# Patient Record
Sex: FEMALE | Race: WHITE | NOT HISPANIC OR LATINO | Employment: FULL TIME | URBAN - METROPOLITAN AREA
[De-identification: names, ages, dates, MRNs, and addresses within clinical notes are randomized per-mention and may not be internally consistent; named-entity substitution may affect disease eponyms.]

---

## 2018-06-17 ENCOUNTER — OFFICE VISIT (OUTPATIENT)
Dept: URGENT CARE | Facility: URGENT CARE | Age: 53
End: 2018-06-17
Payer: COMMERCIAL

## 2018-06-17 VITALS
HEART RATE: 97 BPM | TEMPERATURE: 98.2 F | DIASTOLIC BLOOD PRESSURE: 80 MMHG | SYSTOLIC BLOOD PRESSURE: 132 MMHG | WEIGHT: 228.7 LBS | RESPIRATION RATE: 20 BRPM | OXYGEN SATURATION: 99 %

## 2018-06-17 DIAGNOSIS — H60.391 INFECTIVE OTITIS EXTERNA, RIGHT: Primary | ICD-10-CM

## 2018-06-17 PROCEDURE — 99203 OFFICE O/P NEW LOW 30 MIN: CPT | Performed by: NURSE PRACTITIONER

## 2018-06-17 RX ORDER — LOSARTAN POTASSIUM 25 MG/1
25 TABLET ORAL DAILY
COMMUNITY
Start: 2018-01-09

## 2018-06-17 RX ORDER — CEFDINIR 300 MG/1
300 CAPSULE ORAL 2 TIMES DAILY
Qty: 20 CAPSULE | Refills: 0 | Status: SHIPPED | OUTPATIENT
Start: 2018-06-17 | End: 2022-12-22

## 2018-06-17 RX ORDER — CHOLECALCIFEROL (VITAMIN D3) 50 MCG
2000 TABLET ORAL DAILY
COMMUNITY
Start: 2016-07-22

## 2018-06-17 RX ORDER — MUPIROCIN 20 MG/G
OINTMENT TOPICAL 3 TIMES DAILY
Qty: 22 G | Refills: 1 | Status: SHIPPED | OUTPATIENT
Start: 2018-06-17 | End: 2018-06-22

## 2018-06-17 RX ORDER — ASPIRIN 81 MG/1
81 TABLET ORAL EVERY EVENING
COMMUNITY
Start: 2014-08-01

## 2018-06-17 RX ORDER — BLOOD-GLUCOSE METER
EACH MISCELLANEOUS
COMMUNITY
Start: 2014-08-01

## 2018-06-17 RX ORDER — GLIMEPIRIDE 2 MG/1
2 TABLET ORAL
COMMUNITY
Start: 2018-01-09

## 2018-06-17 RX ORDER — SIMVASTATIN 10 MG
10 TABLET ORAL AT BEDTIME
COMMUNITY
Start: 2018-01-09

## 2018-06-17 NOTE — PROGRESS NOTES
SUBJECTIVE: Guerita Gonzalez is a 52 year old female with 1 days history of pain and swelling of LEFT ear. Temperature not elevated at home. Reports no changes in activity level, appetite, sleep, bowel or bladder habits. Reports taking no OTC medications.     OBJECTIVE:  /80  Pulse 97  Temp 98.2  F (36.8  C) (Oral)  Resp 20  Wt 228 lb 11.2 oz (103.7 kg)  SpO2 99%  General: Alert, oriented in no acute distress  ENT: RIGHT Pinna with one  erythemic pustule noted, tragus and helix tender to light touch, TM with cloudy fluid, dull.  LEFT TM clear with good light reflex, without fluid or erythema, canals clear without erythema. Nose with congestion. Throat and pharynx with minimal erythema  Neck: supple with no adenopathy   CV: S1, S2 auscultated with regular rate and rhythm, no gallops, murmurs or rubs noted  Lungs: Bilateral lobes clear throughout without wheezes, crackles, or rhonchi noted.     ASSESSMENT: RIGHT Infective otitis externa    PLAN: Discussed ear infection diagnosis, plan and treatment with antibiotics, advised completion of full course and follow up if symptoms do not improve or fever persists past 3 days. OTC tylenol or short course NSAIDs  as needed for pain and swelling. Also discussed use of Bactroban topical ointment. Follow up as needed. Parent agreed to the plan of care.       London Sanchez, APRN, CNP

## 2018-06-17 NOTE — PATIENT INSTRUCTIONS
External Ear Infection (Adult)    External otitis (also called  swimmer s ear ) is an infection in the ear canal. It is often caused by bacteria or fungus. It can occur a few days after water gets trapped in the ear canal (from swimming or bathing). It can also occur after cleaning too deeply in the ear canal with a cotton swab or other object. Sometimes, hair care products get into the ear canal and cause this problem.  Symptoms can include pain, fever, itching, redness, drainage, or swelling of the ear canal. Temporary hearing loss may also occur.  Home care    Do not try to clean the ear canal. This can push pus and bacteria deeper into the canal.    Use prescribed ear drops as directed. These help reduce swelling and fight the infection. If an ear wick was placed in the ear canal, apply drops right onto the end of the wick. The wick will draw the medicine into the ear canal even if it is swollen closed.    A cotton ball may be loosely placed in the outer ear to absorb any drainage.    You may use acetaminophen or ibuprofen to control pain, unless another medicine was prescribed. Note: If you have chronic liver or kidney disease or ever had a stomach ulcer or GI bleeding, talk to your healthcare provider before taking any of these medicines.    Do not allow water to get into your ear when bathing. Also, don't swim until the infection has cleared.  Prevention    Keep your ears dry. This helps lower the risk of infection. Dry your ears with a towel or hair dryer after getting wet. Also, use ear plugs when swimming.    Do not stick any objects in the ear to remove wax.    If you feel water trapped in your ear, use ear drops right away. You can get these drops over the counter at most drugstores. They work by removing water from the ear canal.  Follow-up care  Follow up with your healthcare provider in 1 week, or as advised.  When to seek medical advice  Call your healthcare provider right away if any of these  occur:    Ear pain becomes worse or doesn t improve after 3 days of treatment    Redness or swelling of the outer ear occurs or gets worse    Headache    Painful or stiff neck    Drowsiness or confusion    Fever of 100.4 F (38 C) or higher, or as directed by your healthcare provider    Seizure  Date Last Reviewed: 10/1/2017    6929-5567 [employer]. [address] [place] [address]. All rights reserved. This information is not intended as a substitute for professional medical care. Always follow your healthcare professional's instructions.        External Ear Infection (Adult)    External otitis (also called  swimmer s ear ) is an infection in the ear canal. It is often caused by bacteria or fungus. It can occur a few days after water gets trapped in the ear canal (from swimming or bathing). It can also occur after cleaning too deeply in the ear canal with a cotton swab or other object. Sometimes, hair care products get into the ear canal and cause this problem.  Symptoms can include pain, fever, itching, redness, drainage, or swelling of the ear canal. Temporary hearing loss may also occur.  Home care    Do not try to clean the ear canal. This can push pus and bacteria deeper into the canal.    Use prescribed ear drops as directed. These help reduce swelling and fight the infection. If an ear wick was placed in the ear canal, apply drops right onto the end of the wick. The wick will draw the medicine into the ear canal even if it is swollen closed.    A cotton ball may be loosely placed in the outer ear to absorb any drainage.    You may use acetaminophen or ibuprofen to control pain, unless another medicine was prescribed. Note: If you have chronic liver or kidney disease or ever had a stomach ulcer or GI bleeding, talk to your healthcare provider before taking any of these medicines.    Do not allow water to get into your ear when bathing. Also, don't swim until the infection has  cleared.  Prevention    Keep your ears dry. This helps lower the risk of infection. Dry your ears with a towel or hair dryer after getting wet. Also, use ear plugs when swimming.    Do not stick any objects in the ear to remove wax.    If you feel water trapped in your ear, use ear drops right away. You can get these drops over the counter at most drugstores. They work by removing water from the ear canal.  Follow-up care  Follow up with your healthcare provider in 1 week, or as advised.  When to seek medical advice  Call your healthcare provider right away if any of these occur:    Ear pain becomes worse or doesn t improve after 3 days of treatment    Redness or swelling of the outer ear occurs or gets worse    Headache    Painful or stiff neck    Drowsiness or confusion    Fever of 100.4 F (38 C) or higher, or as directed by your healthcare provider    Seizure  Date Last Reviewed: 10/1/2017    9261-6826 [employer]. [address] [place] [address], [place] [address]. All rights reserved. This information is not intended as a substitute for professional medical care. Always follow your healthcare professional's instructions.

## 2018-06-17 NOTE — MR AVS SNAPSHOT
After Visit Summary   6/17/2018    Guerita Gonzalez    MRN: [id number]           Patient Information     Date Of Birth          1965        Visit Information        Provider Department      6/17/2018 11:50 AM Lavon Sanchez APRN CNP [facility]        Today's Diagnoses     Infective otitis externa, right    -  1      Care Instructions      External Ear Infection (Adult)    External otitis (also called  swimmer s ear ) is an infection in the ear canal. It is often caused by bacteria or fungus. It can occur a few days after water gets trapped in the ear canal (from swimming or bathing). It can also occur after cleaning too deeply in the ear canal with a cotton swab or other object. Sometimes, hair care products get into the ear canal and cause this problem.  Symptoms can include pain, fever, itching, redness, drainage, or swelling of the ear canal. Temporary hearing loss may also occur.  Home care    Do not try to clean the ear canal. This can push pus and bacteria deeper into the canal.    Use prescribed ear drops as directed. These help reduce swelling and fight the infection. If an ear wick was placed in the ear canal, apply drops right onto the end of the wick. The wick will draw the medicine into the ear canal even if it is swollen closed.    A cotton ball may be loosely placed in the outer ear to absorb any drainage.    You may use acetaminophen or ibuprofen to control pain, unless another medicine was prescribed. Note: If you have chronic liver or kidney disease or ever had a stomach ulcer or GI bleeding, talk to your healthcare provider before taking any of these medicines.    Do not allow water to get into your ear when bathing. Also, don't swim until the infection has cleared.  Prevention    Keep your ears dry. This helps lower the risk of infection. Dry your ears with a towel or hair dryer after getting wet. Also, use ear plugs when swimming.    Do not  stick any objects in the ear to remove wax.    If you feel water trapped in your ear, use ear drops right away. You can get these drops over the counter at most drugstores. They work by removing water from the ear canal.  Follow-up care  Follow up with your healthcare provider in 1 week, or as advised.  When to seek medical advice  Call your healthcare provider right away if any of these occur:    Ear pain becomes worse or doesn t improve after 3 days of treatment    Redness or swelling of the outer ear occurs or gets worse    Headache    Painful or stiff neck    Drowsiness or confusion    Fever of 100.4 F (38 C) or higher, or as directed by your healthcare provider    Seizure  Date Last Reviewed: 10/1/2017    8089-1633 [employer]. [address] [place] [address], [place] [address]. All rights reserved. This information is not intended as a substitute for professional medical care. Always follow your healthcare professional's instructions.        External Ear Infection (Adult)    External otitis (also called  swimmer s ear ) is an infection in the ear canal. It is often caused by bacteria or fungus. It can occur a few days after water gets trapped in the ear canal (from swimming or bathing). It can also occur after cleaning too deeply in the ear canal with a cotton swab or other object. Sometimes, hair care products get into the ear canal and cause this problem.  Symptoms can include pain, fever, itching, redness, drainage, or swelling of the ear canal. Temporary hearing loss may also occur.  Home care    Do not try to clean the ear canal. This can push pus and bacteria deeper into the canal.    Use prescribed ear drops as directed. These help reduce swelling and fight the infection. If an ear wick was placed in the ear canal, apply drops right onto the end of the wick. The wick will draw the medicine into the ear canal even if it is swollen closed.    A cotton ball may be loosely placed in the outer ear to  absorb any drainage.    You may use acetaminophen or ibuprofen to control pain, unless another medicine was prescribed. Note: If you have chronic liver or kidney disease or ever had a stomach ulcer or GI bleeding, talk to your healthcare provider before taking any of these medicines.    Do not allow water to get into your ear when bathing. Also, don't swim until the infection has cleared.  Prevention    Keep your ears dry. This helps lower the risk of infection. Dry your ears with a towel or hair dryer after getting wet. Also, use ear plugs when swimming.    Do not stick any objects in the ear to remove wax.    If you feel water trapped in your ear, use ear drops right away. You can get these drops over the counter at most drugstores. They work by removing water from the ear canal.  Follow-up care  Follow up with your healthcare provider in 1 week, or as advised.  When to seek medical advice  Call your healthcare provider right away if any of these occur:    Ear pain becomes worse or doesn t improve after 3 days of treatment    Redness or swelling of the outer ear occurs or gets worse    Headache    Painful or stiff neck    Drowsiness or confusion    Fever of 100.4 F (38 C) or higher, or as directed by your healthcare provider    Seizure  Date Last Reviewed: 10/1/2017    5606-5135 [employer]. [address] [place] [address], [place] [address]. All rights reserved. This information is not intended as a substitute for professional medical care. Always follow your healthcare professional's instructions.                Follow-ups after your visit        Follow-up notes from your care team     Return if symptoms worsen or fail to improve.      Who to contact     If you have questions or need follow up information about today's clinic visit or your schedule please contact [facility] directly at [phone].  Normal or non-critical lab and imaging results will be communicated to you by  MyChart, letter or phone within 4 business days after the clinic has received the results. If you do not hear from us within 7 days, please contact the clinic through MyChart or phone. If you have a critical or abnormal lab result, we will notify you by phone as soon as possible.  Submit refill requests through [employer] or call your pharmacy and they will forward the refill request to us. Please allow 3 business days for your refill to be completed.          Additional Information About Your Visit        Care EveryWhere ID     This is your Care EveryWhere ID. This could be used by other organizations to access your [place] medical records  [id number]        Your Vitals Were     Pulse Temperature Respirations Pulse Oximetry          97 98.2  F (36.8  C) (Oral) 20 99%         Blood Pressure from Last 3 Encounters:   06/17/18 132/80   03/30/13 144/84    Weight from Last 3 Encounters:   06/17/18 228 lb 11.2 oz (103.7 kg)   03/30/13 224 lb (101.6 kg)              Today, you had the following     No orders found for display         Today's Medication Changes          These changes are accurate as of 6/17/18  1:31 PM.  If you have any questions, ask your nurse or doctor.               Start taking these medicines.        Dose/Directions    cefdinir 300 MG capsule   Commonly known as:  OMNICEF   Used for:  Infective otitis externa, right        Dose:  300 mg   Take 1 capsule (300 mg) by mouth 2 times daily   Quantity:  20 capsule   Refills:  0       mupirocin 2 % ointment   Commonly known as:  BACTROBAN   Used for:  Infective otitis externa, right        Apply topically 3 times daily for 5 days   Quantity:  22 g   Refills:  1            Where to get your medicines      These medications were sent to [id number] - [place], [place] - [address]  [address] [address]     Phone:  [phone]     cefdinir 300 MG capsule    mupirocin 2 % ointment                Primary Care Provider     None Specified       No primary provider on file.        Equal Access to Services     JARROD URBAN : Hadii aad ku hadjadkimani Kaminski, waluis ada kanerodneyha, qabettyta sarahmajulissa maravilla. So [facility] [phone].    ATENCIÓN: Si habla español, tiene a julien disposición servicios gratuitos de asistencia lingüística. Dilipame al [phone].    We comply with applicable federal civil rights laws and [place] laws. We do not discriminate on the basis of race, color, national origin, age, disability, sex, sexual orientation, or gender identity.            Thank you!     Thank you for choosing [facility]  for your care. Our goal is always to provide you with excellent care. Hearing back from our patients is one way we can continue to improve our services. Please take a few minutes to complete the written survey that you may receive in the mail after your visit with us. Thank you!             Your Updated Medication List - Protect others around you: Learn how to safely use, store and throw away your medicines at www.disposemymeds.org.          This list is accurate as of 6/17/18  1:31 PM.  Always use your most recent med list.                   Brand Name Dispense Instructions for use Diagnosis    amLODIPine 10 MG tablet    NORVASC     Take 10 mg by mouth daily.        aspirin 81 MG EC tablet      Take 81 mg by mouth        cefdinir 300 MG capsule    OMNICEF    20 capsule    Take 1 capsule (300 mg) by mouth 2 times daily    Infective otitis externa, right       fluticasone 50 MCG/ACT spray    FLONASE    1 Package    Spray 2 sprays in nostril daily.    URI (upper respiratory infection)       glimepiride 2 MG tablet    AMARYL     Take 2 mg by mouth        [employer] TEST STRIPS test strip   Generic drug:  blood glucose monitoring      Dispense test strips covered by the patient insurance. Test 2-4 times per day.        losartan 25 MG tablet    COZAAR     Take 25 mg by mouth         metFORMIN 1000 MG tablet    GLUCOPHAGE     Take 1,000 mg by mouth        mupirocin 2 % ointment    BACTROBAN    22 g    Apply topically 3 times daily for 5 days    Infective otitis externa, right       simvastatin 10 MG tablet    ZOCOR     Take 10 mg by mouth        [employer] BLOOD GLUCOSE MONITOR w/Device Kit      Dispense [employer] Deana glucose meter, test strips and lancets covered by the patient insurance. Test 2-4  times per day.        vitamin D 2000 units tablet      Take 2,000 Units by mouth

## 2021-06-15 ENCOUNTER — HOSPITAL ENCOUNTER (EMERGENCY)
Facility: CLINIC | Age: 56
Discharge: HOME | End: 2021-06-15
Attending: PHYSICIAN ASSISTANT | Admitting: PHYSICIAN ASSISTANT
Payer: COMMERCIAL

## 2021-06-15 ENCOUNTER — APPOINTMENT (OUTPATIENT)
Dept: CT IMAGING | Facility: CLINIC | Age: 56
End: 2021-06-15
Attending: PHYSICIAN ASSISTANT
Payer: COMMERCIAL

## 2021-06-15 VITALS
HEIGHT: 65 IN | HEART RATE: 99 BPM | BODY MASS INDEX: 38.06 KG/M2 | OXYGEN SATURATION: 96 % | TEMPERATURE: 97.9 F | SYSTOLIC BLOOD PRESSURE: 122 MMHG | DIASTOLIC BLOOD PRESSURE: 78 MMHG | RESPIRATION RATE: 18 BRPM

## 2021-06-15 DIAGNOSIS — R10.9 RIGHT FLANK PAIN: ICD-10-CM

## 2021-06-15 LAB
ALBUMIN SERPL-MCNC: 3.8 G/DL (ref 3.4–5)
ALBUMIN UR-MCNC: 50 MG/DL
ALP SERPL-CCNC: 94 U/L (ref 40–150)
ALT SERPL W P-5'-P-CCNC: 72 U/L (ref 0–50)
AMORPH CRY #/AREA URNS HPF: ABNORMAL /HPF
ANION GAP SERPL CALCULATED.3IONS-SCNC: 13 MMOL/L (ref 3–14)
APPEARANCE UR: ABNORMAL
AST SERPL W P-5'-P-CCNC: 55 U/L (ref 0–45)
BASOPHILS # BLD AUTO: 0.1 10E9/L (ref 0–0.2)
BASOPHILS NFR BLD AUTO: 0.9 %
BILIRUB SERPL-MCNC: 0.6 MG/DL (ref 0.2–1.3)
BILIRUB UR QL STRIP: NEGATIVE
BUN SERPL-MCNC: 7 MG/DL (ref 7–30)
CALCIUM SERPL-MCNC: 9.6 MG/DL (ref 8.5–10.1)
CHLORIDE SERPL-SCNC: 103 MMOL/L (ref 94–109)
CO2 SERPL-SCNC: 22 MMOL/L (ref 20–32)
COLOR UR AUTO: ABNORMAL
CREAT SERPL-MCNC: 0.58 MG/DL (ref 0.52–1.04)
DIFFERENTIAL METHOD BLD: ABNORMAL
EOSINOPHIL # BLD AUTO: 0.2 10E9/L (ref 0–0.7)
EOSINOPHIL NFR BLD AUTO: 1.4 %
ERYTHROCYTE [DISTWIDTH] IN BLOOD BY AUTOMATED COUNT: 14.3 % (ref 10–15)
GFR SERPL CREATININE-BSD FRML MDRD: >90 ML/MIN/{1.73_M2}
GLUCOSE SERPL-MCNC: 185 MG/DL (ref 70–99)
GLUCOSE UR STRIP-MCNC: 30 MG/DL
HCT VFR BLD AUTO: 42.7 % (ref 35–47)
HGB BLD-MCNC: 14.4 G/DL (ref 11.7–15.7)
HGB UR QL STRIP: NEGATIVE
IMM GRANULOCYTES # BLD: 0.1 10E9/L (ref 0–0.4)
IMM GRANULOCYTES NFR BLD: 0.7 %
KETONES UR STRIP-MCNC: 10 MG/DL
LEUKOCYTE ESTERASE UR QL STRIP: NEGATIVE
LIPASE SERPL-CCNC: 139 U/L (ref 73–393)
LYMPHOCYTES # BLD AUTO: 3.9 10E9/L (ref 0.8–5.3)
LYMPHOCYTES NFR BLD AUTO: 25.7 %
MCH RBC QN AUTO: 30.3 PG (ref 26.5–33)
MCHC RBC AUTO-ENTMCNC: 33.7 G/DL (ref 31.5–36.5)
MCV RBC AUTO: 90 FL (ref 78–100)
MONOCYTES # BLD AUTO: 1 10E9/L (ref 0–1.3)
MONOCYTES NFR BLD AUTO: 6.4 %
MUCOUS THREADS #/AREA URNS LPF: PRESENT /LPF
NEUTROPHILS # BLD AUTO: 9.9 10E9/L (ref 1.6–8.3)
NEUTROPHILS NFR BLD AUTO: 64.9 %
NITRATE UR QL: NEGATIVE
NRBC # BLD AUTO: 0 10*3/UL
NRBC BLD AUTO-RTO: 0 /100
PH UR STRIP: 5.5 PH (ref 5–7)
PLATELET # BLD AUTO: 341 10E9/L (ref 150–450)
POTASSIUM SERPL-SCNC: 3.4 MMOL/L (ref 3.4–5.3)
PROT SERPL-MCNC: 8.2 G/DL (ref 6.8–8.8)
RBC # BLD AUTO: 4.76 10E12/L (ref 3.8–5.2)
RBC #/AREA URNS AUTO: 0 /HPF (ref 0–2)
SODIUM SERPL-SCNC: 138 MMOL/L (ref 133–144)
SOURCE: ABNORMAL
SP GR UR STRIP: 1.03 (ref 1–1.03)
SQUAMOUS #/AREA URNS AUTO: 8 /HPF (ref 0–1)
UROBILINOGEN UR STRIP-MCNC: 2 MG/DL (ref 0–2)
WBC # BLD AUTO: 15.2 10E9/L (ref 4–11)
WBC #/AREA URNS AUTO: 0 /HPF (ref 0–5)

## 2021-06-15 PROCEDURE — 96374 THER/PROPH/DIAG INJ IV PUSH: CPT

## 2021-06-15 PROCEDURE — 83690 ASSAY OF LIPASE: CPT | Performed by: PHYSICIAN ASSISTANT

## 2021-06-15 PROCEDURE — 250N000011 HC RX IP 250 OP 636: Performed by: PHYSICIAN ASSISTANT

## 2021-06-15 PROCEDURE — 96375 TX/PRO/DX INJ NEW DRUG ADDON: CPT

## 2021-06-15 PROCEDURE — 258N000003 HC RX IP 258 OP 636: Performed by: PHYSICIAN ASSISTANT

## 2021-06-15 PROCEDURE — 81001 URINALYSIS AUTO W/SCOPE: CPT | Performed by: PHYSICIAN ASSISTANT

## 2021-06-15 PROCEDURE — 99285 EMERGENCY DEPT VISIT HI MDM: CPT | Mod: 25

## 2021-06-15 PROCEDURE — 80053 COMPREHEN METABOLIC PANEL: CPT | Performed by: PHYSICIAN ASSISTANT

## 2021-06-15 PROCEDURE — 74176 CT ABD & PELVIS W/O CONTRAST: CPT

## 2021-06-15 PROCEDURE — 85025 COMPLETE CBC W/AUTO DIFF WBC: CPT | Performed by: PHYSICIAN ASSISTANT

## 2021-06-15 PROCEDURE — 96361 HYDRATE IV INFUSION ADD-ON: CPT

## 2021-06-15 RX ORDER — OXYCODONE HYDROCHLORIDE 5 MG/1
5 TABLET ORAL EVERY 6 HOURS PRN
Qty: 12 TABLET | Refills: 0 | Status: SHIPPED | OUTPATIENT
Start: 2021-06-15 | End: 2022-12-22

## 2021-06-15 RX ORDER — ONDANSETRON 2 MG/ML
4 INJECTION INTRAMUSCULAR; INTRAVENOUS EVERY 30 MIN PRN
Status: DISCONTINUED | OUTPATIENT
Start: 2021-06-15 | End: 2021-06-15 | Stop reason: HOSPADM

## 2021-06-15 RX ORDER — ONDANSETRON 4 MG/1
4 TABLET, ORALLY DISINTEGRATING ORAL EVERY 8 HOURS PRN
Qty: 10 TABLET | Refills: 0 | Status: SHIPPED | OUTPATIENT
Start: 2021-06-15 | End: 2021-06-18

## 2021-06-15 RX ORDER — HYDROMORPHONE HYDROCHLORIDE 1 MG/ML
0.5 INJECTION, SOLUTION INTRAMUSCULAR; INTRAVENOUS; SUBCUTANEOUS
Status: DISCONTINUED | OUTPATIENT
Start: 2021-06-15 | End: 2021-06-15 | Stop reason: HOSPADM

## 2021-06-15 RX ADMIN — HYDROMORPHONE HYDROCHLORIDE 0.5 MG: 1 INJECTION, SOLUTION INTRAMUSCULAR; INTRAVENOUS; SUBCUTANEOUS at 13:10

## 2021-06-15 RX ADMIN — ONDANSETRON 4 MG: 2 INJECTION INTRAMUSCULAR; INTRAVENOUS at 13:10

## 2021-06-15 RX ADMIN — SODIUM CHLORIDE 1000 ML: 9 INJECTION, SOLUTION INTRAVENOUS at 12:53

## 2021-06-15 ASSESSMENT — ENCOUNTER SYMPTOMS
ANAL BLEEDING: 0
CONSTIPATION: 0
BLOOD IN STOOL: 0
DIFFICULTY URINATING: 0
VOMITING: 0
FLANK PAIN: 1
SHORTNESS OF BREATH: 0
DIARRHEA: 0
DYSURIA: 0
NAUSEA: 1

## 2021-06-15 NOTE — ED PROVIDER NOTES
"  History   Chief Complaint:  Flank Pain       HPI   Guerita Gonzalez is a 55 year old female who presents with right lower flank pain. The patient stated that she has developed a flank pain this morning about 5 hours ago that has been getting progressively worse reaching 8/10 in severity at this moment, along with some nausea. She denies history of back pain or kidney stones. She further denied fever, chills, chest pain or shortness of breath, vomiting, diarrhea or constipation, urinary symptoms, vaginal bleeding discharge or pain, and any blood in stool or rectal bleeding, as well as any specific trauma or recent falls.    Review of Systems   Respiratory: Negative for shortness of breath.    Cardiovascular: Negative for chest pain.   Gastrointestinal: Positive for nausea. Negative for anal bleeding, blood in stool, constipation, diarrhea and vomiting.   Genitourinary: Positive for flank pain. Negative for difficulty urinating, dysuria, vaginal bleeding, vaginal discharge and vaginal pain.   All other systems reviewed and are negative.      Allergies:  Penicillins    Medications:  Amlodipine  Aspirin 81  Omnicef  Flonase  Glimepiride  Metformin  Simvastatin      Past Medical History:    Diabetes   Hyperlipidemia   Hypertension   Diabetic neuropathy     Past Surgical History:    Appendectomy     Family History:    Cancer   Father  Diabetes   Father  Heart disease   Father  Hypertension    Mother    Social History:  The patient was brought to the emergency department by private vehicle.  The patient presents to the emergency department alone.    Physical Exam     Patient Vitals for the past 24 hrs:   BP Temp Temp src Pulse Resp SpO2 Height   06/15/21 1430 (!) 151/79 -- -- 84 -- 97 % --   06/15/21 1400 (!) 158/94 -- -- 102 -- 94 % --   06/15/21 1300 (!) 153/76 -- -- 103 -- 97 % --   06/15/21 1223 (!) 164/90 97.9  F (36.6  C) Temporal 123 18 97 % 1.651 m (5' 5\")       Physical Exam  Constitutional: Pleasant. Cooperative. "   Eyes: Pupils equally round and reactive  HENT: Head is normal in appearance. Oropharynx is normal with moist mucus membranes.  Cardiovascular: Regular rate and rhythm and without murmurs.  Respiratory: Normal respiratory effort, lungs are clear bilaterally.  GI: Abdomen is soft, non-tender, non-distended. No guarding, rebound, or rigidity.  Musculoskeletal: No asymmetry of the lower extremities, no tenderness to palpation. No CVA TTP.  Skin: Normal, without rash.  Neurologic: Cranial nerves grossly intact, normal cognition, no focal deficits. Alert and oriented x 3.   Psychiatric: Normal affect.  Nursing notes and vital signs reviewed.    Emergency Department Course     Imaging:    CT Abdomen Pelvis w/o Contrast  1.  Normal noncontrast CT of the abdomen and pelvis. No urinary tract  calculi. No specific finding to explain the patient's pain.    Reading per radiology.    Laboratory:    CBC: WBC 15.2, HGB 14.4,      CMP: Glucose 185 (H), ALT: 72 (H), AST: 55 (H), o/w WNL (Creatinine: 0.58)    UA: Glucose: 30, Ketones: 10, Protein Albumin: 50, Squamous Epithelial: 8 (H), Mucous: Present, Amorphous Crystals: few, o/w Negative    Lipase: 139      Emergency Department Course:    Reviewed:  I reviewed nursing notes, vitals and past medical history    Assessments/Consults    1240 I obtained history and examined the patient as noted above.     1457 I reassessed the patient and explained findings.     Interventions:  1253 NS 1000 mL IV  1310 Zofran 4 mg IV  1310 Dilaudid 0.5 mg IV    Disposition:  The patient was discharged to home.       Impression & Plan     Medical Decision Making:  Guerita Gonzalez is a 55 year old female who presents to the ED for evaluation of right-sided flank pain.  See HPI as above for additional details.  Vitals and physical exam as above.  Differential was broad including kidney stone, pyelonephritis, shingles, SBO, perforated viscus, MSK, hernia, among others.  Work-up obtained as above.   Discussed with patient unclear etiology of her symptoms at this time.  No evidence for nefarious pathology at this time.  [place] patient was safe for discharge home with close outpatient follow-up with persistent pain.  Prescriptions for medications as below.  Discussed narcotic precautions. Discussed reasons to return. All questions answered. Patient discharged to home in stable condition.    Diagnosis:    ICD-10-CM    1. Right flank pain  R10.9        Discharge Medications:  New Prescriptions    ONDANSETRON (ZOFRAN ODT) 4 MG ODT TAB    Take 1 tablet (4 mg) by mouth every 8 hours as needed for nausea    OXYCODONE (ROXICODONE) 5 MG TABLET    Take 1 tablet (5 mg) by mouth every 6 hours as needed for pain       Scribe Disclosure:  IQue, am serving as a scribe at 12:55 PM on 6/15/2021 to document services personally performed by Marcelino Liao PA-C based on my observations and the provider's statements to me.     This record was created at least in part using electronic voice recognition software, so please excuse any typographical errors.           Marcelino Liao PA-C  06/15/21 6436

## 2021-06-15 NOTE — DISCHARGE INSTRUCTIONS
For pain, you can take up to 1000 mg or 1 g of Tylenol.  You can take 600 mg of ibuprofen at one time.  You can alternate these medications every 3 hours.  Always take ibuprofen with food.  Never take more than 4 g (4000 mg) of Tylenol or 3200mg of ibuprofen in one day.  Do not take this amount for more than 1 week at a time.     Use oxycodone for breakthrough pain. This is sedating. Do not drive after taking.

## 2022-12-22 ENCOUNTER — HOSPITAL ENCOUNTER (INPATIENT)
Facility: CLINIC | Age: 57
LOS: 4 days | Discharge: HOME | DRG: 616 | End: 2022-12-26
Attending: PHYSICIAN ASSISTANT | Admitting: HOSPITALIST
Payer: COMMERCIAL

## 2022-12-22 ENCOUNTER — APPOINTMENT (OUTPATIENT)
Dept: GENERAL RADIOLOGY | Facility: CLINIC | Age: 57
DRG: 616 | End: 2022-12-22
Attending: EMERGENCY MEDICINE
Payer: COMMERCIAL

## 2022-12-22 ENCOUNTER — APPOINTMENT (OUTPATIENT)
Dept: MRI IMAGING | Facility: CLINIC | Age: 57
DRG: 616 | End: 2022-12-22
Attending: HOSPITALIST
Payer: COMMERCIAL

## 2022-12-22 DIAGNOSIS — E11.42 DIABETIC POLYNEUROPATHY ASSOCIATED WITH TYPE 2 DIABETES MELLITUS (H): Primary | ICD-10-CM

## 2022-12-22 DIAGNOSIS — L97.522 ULCER OF LEFT FOOT, WITH FAT LAYER EXPOSED (H): ICD-10-CM

## 2022-12-22 DIAGNOSIS — L08.9 DIABETIC FOOT INFECTION (H): ICD-10-CM

## 2022-12-22 DIAGNOSIS — M86.9 OSTEOMYELITIS OF GREAT TOE OF LEFT FOOT (H): ICD-10-CM

## 2022-12-22 DIAGNOSIS — E11.628 DIABETIC FOOT INFECTION (H): ICD-10-CM

## 2022-12-22 LAB
ANION GAP SERPL CALCULATED.3IONS-SCNC: 12 MMOL/L (ref 3–14)
APTT PPP: 28 SECONDS (ref 22–38)
BASOPHILS # BLD AUTO: 0 10E3/UL (ref 0–0.2)
BASOPHILS NFR BLD AUTO: 0 %
BUN SERPL-MCNC: 7 MG/DL (ref 7–30)
CALCIUM SERPL-MCNC: 9.2 MG/DL (ref 8.5–10.1)
CHLORIDE BLD-SCNC: 101 MMOL/L (ref 94–109)
CO2 SERPL-SCNC: 22 MMOL/L (ref 20–32)
CREAT SERPL-MCNC: 0.6 MG/DL (ref 0.52–1.04)
CRP SERPL-MCNC: 144 MG/L (ref 0–8)
EOSINOPHIL # BLD AUTO: 0.1 10E3/UL (ref 0–0.7)
EOSINOPHIL NFR BLD AUTO: 1 %
ERYTHROCYTE [DISTWIDTH] IN BLOOD BY AUTOMATED COUNT: 12.1 % (ref 10–15)
ERYTHROCYTE [SEDIMENTATION RATE] IN BLOOD BY WESTERGREN METHOD: 63 MM/HR (ref 0–30)
GFR SERPL CREATININE-BSD FRML MDRD: >90 ML/MIN/1.73M2
GLUCOSE BLD-MCNC: 134 MG/DL (ref 70–99)
GLUCOSE BLDC GLUCOMTR-MCNC: 129 MG/DL (ref 70–99)
HBA1C MFR BLD: 6.4 % (ref 0–5.6)
HCT VFR BLD AUTO: 36.8 % (ref 35–47)
HGB BLD-MCNC: 12.6 G/DL (ref 11.7–15.7)
IMM GRANULOCYTES # BLD: 0.1 10E3/UL
IMM GRANULOCYTES NFR BLD: 1 %
INR PPP: 1.19 (ref 0.85–1.15)
KETONES BLD-SCNC: 1.1 MMOL/L (ref 0–0.6)
LACTATE SERPL-SCNC: 1.1 MMOL/L (ref 0.7–2)
LACTATE SERPL-SCNC: 1.8 MMOL/L (ref 0.7–2)
LACTATE SERPL-SCNC: 3.2 MMOL/L (ref 0.7–2)
LYMPHOCYTES # BLD AUTO: 1.4 10E3/UL (ref 0.8–5.3)
LYMPHOCYTES NFR BLD AUTO: 14 %
MAGNESIUM SERPL-MCNC: 1.1 MG/DL (ref 1.6–2.3)
MCH RBC QN AUTO: 31 PG (ref 26.5–33)
MCHC RBC AUTO-ENTMCNC: 34.2 G/DL (ref 31.5–36.5)
MCV RBC AUTO: 91 FL (ref 78–100)
MONOCYTES # BLD AUTO: 0.6 10E3/UL (ref 0–1.3)
MONOCYTES NFR BLD AUTO: 6 %
NEUTROPHILS # BLD AUTO: 7.6 10E3/UL (ref 1.6–8.3)
NEUTROPHILS NFR BLD AUTO: 78 %
NRBC # BLD AUTO: 0 10E3/UL
NRBC BLD AUTO-RTO: 0 /100
PHOSPHATE SERPL-MCNC: 1.3 MG/DL (ref 2.5–4.5)
PLATELET # BLD AUTO: 291 10E3/UL (ref 150–450)
POTASSIUM BLD-SCNC: 2.7 MMOL/L (ref 3.4–5.3)
RBC # BLD AUTO: 4.06 10E6/UL (ref 3.8–5.2)
SODIUM SERPL-SCNC: 135 MMOL/L (ref 133–144)
WBC # BLD AUTO: 9.8 10E3/UL (ref 4–11)

## 2022-12-22 PROCEDURE — 83605 ASSAY OF LACTIC ACID: CPT | Performed by: EMERGENCY MEDICINE

## 2022-12-22 PROCEDURE — 250N000011 HC RX IP 250 OP 636: Performed by: EMERGENCY MEDICINE

## 2022-12-22 PROCEDURE — 99285 EMERGENCY DEPT VISIT HI MDM: CPT | Mod: 25

## 2022-12-22 PROCEDURE — 86140 C-REACTIVE PROTEIN: CPT | Performed by: EMERGENCY MEDICINE

## 2022-12-22 PROCEDURE — 250N000011 HC RX IP 250 OP 636: Performed by: HOSPITALIST

## 2022-12-22 PROCEDURE — 73630 X-RAY EXAM OF FOOT: CPT | Mod: LT

## 2022-12-22 PROCEDURE — 87205 SMEAR GRAM STAIN: CPT | Performed by: EMERGENCY MEDICINE

## 2022-12-22 PROCEDURE — 99222 1ST HOSP IP/OBS MODERATE 55: CPT | Mod: AI | Performed by: HOSPITALIST

## 2022-12-22 PROCEDURE — 250N000013 HC RX MED GY IP 250 OP 250 PS 637: Performed by: HOSPITALIST

## 2022-12-22 PROCEDURE — 83605 ASSAY OF LACTIC ACID: CPT | Performed by: HOSPITALIST

## 2022-12-22 PROCEDURE — 85652 RBC SED RATE AUTOMATED: CPT | Performed by: EMERGENCY MEDICINE

## 2022-12-22 PROCEDURE — 258N000003 HC RX IP 258 OP 636: Performed by: PHYSICIAN ASSISTANT

## 2022-12-22 PROCEDURE — 82010 KETONE BODYS QUAN: CPT | Performed by: EMERGENCY MEDICINE

## 2022-12-22 PROCEDURE — 85610 PROTHROMBIN TIME: CPT | Performed by: EMERGENCY MEDICINE

## 2022-12-22 PROCEDURE — 250N000013 HC RX MED GY IP 250 OP 250 PS 637: Performed by: PHYSICIAN ASSISTANT

## 2022-12-22 PROCEDURE — 258N000003 HC RX IP 258 OP 636: Performed by: EMERGENCY MEDICINE

## 2022-12-22 PROCEDURE — 250N000011 HC RX IP 250 OP 636: Performed by: PHYSICIAN ASSISTANT

## 2022-12-22 PROCEDURE — 87040 BLOOD CULTURE FOR BACTERIA: CPT | Performed by: EMERGENCY MEDICINE

## 2022-12-22 PROCEDURE — 85730 THROMBOPLASTIN TIME PARTIAL: CPT | Performed by: EMERGENCY MEDICINE

## 2022-12-22 PROCEDURE — 258N000003 HC RX IP 258 OP 636: Performed by: HOSPITALIST

## 2022-12-22 PROCEDURE — 84100 ASSAY OF PHOSPHORUS: CPT | Performed by: HOSPITALIST

## 2022-12-22 PROCEDURE — 87077 CULTURE AEROBIC IDENTIFY: CPT | Performed by: EMERGENCY MEDICINE

## 2022-12-22 PROCEDURE — 120N000001 HC R&B MED SURG/OB

## 2022-12-22 PROCEDURE — 73720 MRI LWR EXTREMITY W/O&W/DYE: CPT | Mod: LT

## 2022-12-22 PROCEDURE — 85025 COMPLETE CBC W/AUTO DIFF WBC: CPT | Performed by: EMERGENCY MEDICINE

## 2022-12-22 PROCEDURE — 80048 BASIC METABOLIC PNL TOTAL CA: CPT | Performed by: EMERGENCY MEDICINE

## 2022-12-22 PROCEDURE — 83036 HEMOGLOBIN GLYCOSYLATED A1C: CPT | Performed by: HOSPITALIST

## 2022-12-22 PROCEDURE — 96365 THER/PROPH/DIAG IV INF INIT: CPT

## 2022-12-22 PROCEDURE — 36415 COLL VENOUS BLD VENIPUNCTURE: CPT | Performed by: HOSPITALIST

## 2022-12-22 PROCEDURE — 36415 COLL VENOUS BLD VENIPUNCTURE: CPT | Performed by: EMERGENCY MEDICINE

## 2022-12-22 PROCEDURE — 83735 ASSAY OF MAGNESIUM: CPT | Performed by: HOSPITALIST

## 2022-12-22 RX ORDER — GADOBUTROL 604.72 MG/ML
10 INJECTION INTRAVENOUS ONCE
Status: COMPLETED | OUTPATIENT
Start: 2022-12-23 | End: 2022-12-23

## 2022-12-22 RX ORDER — ACETAMINOPHEN 325 MG/1
650 TABLET ORAL EVERY 6 HOURS PRN
Status: DISCONTINUED | OUTPATIENT
Start: 2022-12-22 | End: 2022-12-24

## 2022-12-22 RX ORDER — ONDANSETRON 2 MG/ML
4 INJECTION INTRAMUSCULAR; INTRAVENOUS EVERY 6 HOURS PRN
Status: DISCONTINUED | OUTPATIENT
Start: 2022-12-22 | End: 2022-12-24

## 2022-12-22 RX ORDER — POTASSIUM CHLORIDE 1.5 G/1.58G
40 POWDER, FOR SOLUTION ORAL ONCE
Status: COMPLETED | OUTPATIENT
Start: 2022-12-22 | End: 2022-12-22

## 2022-12-22 RX ORDER — METRONIDAZOLE 500 MG/100ML
500 INJECTION, SOLUTION INTRAVENOUS EVERY 12 HOURS
Status: DISCONTINUED | OUTPATIENT
Start: 2022-12-22 | End: 2022-12-26

## 2022-12-22 RX ORDER — MAGNESIUM SULFATE HEPTAHYDRATE 40 MG/ML
4 INJECTION, SOLUTION INTRAVENOUS ONCE
Status: COMPLETED | OUTPATIENT
Start: 2022-12-22 | End: 2022-12-23

## 2022-12-22 RX ORDER — SODIUM CHLORIDE 9 MG/ML
INJECTION, SOLUTION INTRAVENOUS CONTINUOUS
Status: DISCONTINUED | OUTPATIENT
Start: 2022-12-22 | End: 2022-12-26 | Stop reason: HOSPADM

## 2022-12-22 RX ORDER — LANOLIN ALCOHOL/MO/W.PET/CERES
1000 CREAM (GRAM) TOPICAL
COMMUNITY

## 2022-12-22 RX ORDER — POTASSIUM CHLORIDE 1500 MG/1
20 TABLET, EXTENDED RELEASE ORAL ONCE
Status: COMPLETED | OUTPATIENT
Start: 2022-12-22 | End: 2022-12-22

## 2022-12-22 RX ORDER — METRONIDAZOLE 500 MG/1
500 TABLET ORAL 2 TIMES DAILY
Status: ON HOLD | COMMUNITY
End: 2022-12-26

## 2022-12-22 RX ORDER — DEXTROSE MONOHYDRATE 25 G/50ML
25-50 INJECTION, SOLUTION INTRAVENOUS
Status: DISCONTINUED | OUTPATIENT
Start: 2022-12-22 | End: 2022-12-26 | Stop reason: HOSPADM

## 2022-12-22 RX ORDER — LEVOFLOXACIN 500 MG/1
750 TABLET, FILM COATED ORAL DAILY
Status: ON HOLD | COMMUNITY
End: 2022-12-26

## 2022-12-22 RX ORDER — NICOTINE POLACRILEX 4 MG
15-30 LOZENGE BUCCAL
Status: DISCONTINUED | OUTPATIENT
Start: 2022-12-22 | End: 2022-12-26 | Stop reason: HOSPADM

## 2022-12-22 RX ORDER — ONDANSETRON 4 MG/1
4 TABLET, ORALLY DISINTEGRATING ORAL EVERY 6 HOURS PRN
Status: DISCONTINUED | OUTPATIENT
Start: 2022-12-22 | End: 2022-12-24

## 2022-12-22 RX ORDER — ACETAMINOPHEN 650 MG/1
650 SUPPOSITORY RECTAL EVERY 6 HOURS PRN
Status: DISCONTINUED | OUTPATIENT
Start: 2022-12-22 | End: 2022-12-26 | Stop reason: HOSPADM

## 2022-12-22 RX ADMIN — VANCOMYCIN HYDROCHLORIDE 2500 MG: 10 INJECTION, POWDER, LYOPHILIZED, FOR SOLUTION INTRAVENOUS at 16:54

## 2022-12-22 RX ADMIN — SODIUM CHLORIDE: 9 INJECTION, SOLUTION INTRAVENOUS at 21:00

## 2022-12-22 RX ADMIN — CEFEPIME HYDROCHLORIDE 2 G: 2 INJECTION, POWDER, FOR SOLUTION INTRAVENOUS at 19:27

## 2022-12-22 RX ADMIN — SODIUM CHLORIDE 1000 ML: 9 INJECTION, SOLUTION INTRAVENOUS at 16:52

## 2022-12-22 RX ADMIN — METRONIDAZOLE 500 MG: 500 INJECTION, SOLUTION INTRAVENOUS at 22:31

## 2022-12-22 RX ADMIN — POTASSIUM CHLORIDE 40 MEQ: 1.5 POWDER, FOR SOLUTION ORAL at 16:49

## 2022-12-22 RX ADMIN — POTASSIUM CHLORIDE 20 MEQ: 1500 TABLET, EXTENDED RELEASE ORAL at 22:31

## 2022-12-22 ASSESSMENT — ACTIVITIES OF DAILY LIVING (ADL)
ADLS_ACUITY_SCORE: 35

## 2022-12-22 ASSESSMENT — ENCOUNTER SYMPTOMS
CHILLS: 0
FEVER: 0

## 2022-12-22 NOTE — ED PROVIDER NOTES
Emergency Department Attending Supervision Note  12/22/2022  5:49 PM    I evaluated this patient in conjunction with GLORIA Benson    Briefly, the patient presented with right great toe pain and wound    On my exam:  Physical Exam   General:  Sitting on bed with  at bedside, comfortable appearing.   HENT:  No obvious trauma to head  Right Ear:  External ear normal.   Left Ear:  External ear normal.   Nose:  Nose normal.   Eyes:  Conjunctivae and EOM are normal.  Neck: Normal range of motion. Neck supple. No tracheal deviation present.   Pulm/Chest: No respiratory distress  M/S: Normal range of motion. Erythema and swelling to the left great toe with subcutaneous purulence.  It is not draining.  There is also an ulcer to the plantar aspect of the great toe as well.  Neuro: Alert. GCS 15.  Skin: Skin is warm and dry. No rash noted. Not diaphoretic.   Psych: Normal mood and affect. Behavior is normal.     Brief MDM:  Guerita Gonzalez is a very pleasant 57 year old year old female who presents to the emergency department with concern of an infection to the left great toe.  Patient is tachycardic and has elevated lactate.  X-ray shows no x-ray findings of osteomyelitis, but MRI will be obtained as well.  Patient was provided broad-spectrum antibiotics.  She will be admitted to the hospital for podiatry involvement.  The hospitalist was contacted who has agreed to admit the patient.    My Impression and diagnosis:    ICD-10-CM    1. Diabetic foot infection (H)  E11.628     L08.9             DO Lenin Jensen Robert James, DO  12/22/22 1847

## 2022-12-22 NOTE — ED TRIAGE NOTES
Patient noticed a wound on the bottom of her foot, had it assessed at the clinic and was advised to come to the ED for further evaluation of her diabetic foot ulcers.      Triage Assessment     Row Name 12/22/22 2961       Triage Assessment (Adult)    Airway WDL WDL       Respiratory WDL    Respiratory WDL WDL       Skin Circulation/Temperature WDL    Skin Circulation/Temperature WDL WDL       Cardiac WDL    Cardiac WDL WDL       Peripheral/Neurovascular WDL    Peripheral Neurovascular WDL WDL       Cognitive/Neuro/Behavioral WDL    Cognitive/Neuro/Behavioral WDL WDL

## 2022-12-22 NOTE — ED PROVIDER NOTES
Rapid Assessment Note    History:   Guerita Gonzalez is a 57 year old female who presents with history of diabetes who presents for a progressive infection on the left great toe.  She says she had wound that developed in September.  She denies any significant treatment until this week.  2 days ago she was started on Levaquin and metronidazole.  She does have a penicillins and cries.  Today she was seen by her primary care physician referred into the ED.  She says today she has developed increasing erythema spreading over the dorsum of the foot.  She has diabetic neuropathy and does not have much sensation in the foot.  She has noted some blistering over the left great toe with discharge now as well.  She has not noted any fevers or chills or nausea or other systemic symptoms.    Exam:   General:  Alert, interactive  Cardiovascular: Regular rate  Lungs:  No respiratory distress, no accessory muscle use  Neuro:  Moving all 4 extremities  Skin: There is a wound over the left great toe.  There is blistering and skin breakdown.  There is discharge from the wound.  The toe is purplish and discolored.  The dorsalis pedis pulses palpable.  There is erythema extending onto the dorsum of the foot.  Psych:  Normal affect      Plan of Care:   I evaluated the patient and developed an initial plan of care. I discussed this plan and explained that I, or one of my partners, would be returning to complete the evaluation.     .      René Pressley MD  12/22/22 6582

## 2022-12-22 NOTE — LETTER
[facility]  [address] [address]  [phone]          December 26, 2022    RE:  Guerita Gonzalez                                                                                                                                                       [address] [address]            To whom it may concern:    Guerita Gonzalez was under my professional care for necessary medical services at [facility] from 12/22/2026 to 12/26/2022.  She may return to work with the following: The employee is UNABLE to return to work until 01/02/2023    When the patient returns to work, the following restrictions apply until 01/16/2022:  A) Walk/Stand: Occasionally (1-3 hours)        Sincerely,          MARTA HARRELL MD  [facility] Hospitalist  12/26/2022

## 2022-12-22 NOTE — ED PROVIDER NOTES
History     Chief Complaint:  Wound Check       HPI   Guerita Gonzalez is a 57 year old female with a hx of diabetes, presents to the ER for evaluation of a wound on her right great toe.  She noticed that she had some ulceration a while ago however the past 2 days it has become more red and swollen.  She has no fever, chills, or other. She cannot feel her feet    ROS:  Review of Systems   Constitutional: Negative for chills and fever.        All other systems reviewed and are negative.    Allergies:  Penicillins     Medications:    amLODIPine (NORVASC) 10 MG tablet  aspirin 81 MG EC tablet  blood glucose monitoring ([employer] TEST STRIPS) test strip  Blood Glucose Monitoring Suppl ([employer] BLOOD GLUCOSE MONITOR) w/Device KIT  cefdinir (OMNICEF) 300 MG capsule  Cholecalciferol (VITAMIN D) 2000 units tablet  fluticasone (FLONASE) 50 MCG/ACT nasal spray  glimepiride (AMARYL) 2 MG tablet  losartan (COZAAR) 25 MG tablet  metFORMIN (GLUCOPHAGE) 1000 MG tablet  oxyCODONE (ROXICODONE) 5 MG tablet  simvastatin (ZOCOR) 10 MG tablet        Past Medical History:    Past Medical History:   Diagnosis Date     Benign essential hypertension      Diabetes (H)      Patient Active Problem List   Diagnosis     Diabetic foot infection (H)        Past Surgical History:    Past Surgical History:   Procedure Laterality Date     APPENDECTOMY          Family History:    family history includes Diabetes in her father.    Social History:   reports that she has been smoking cigarettes. She does not have any smokeless tobacco history on file. She reports current alcohol use. She reports that she does not use drugs.  PCP: Lesly Ga     Physical Exam     Patient Vitals for the past 24 hrs:   BP Temp Temp src Pulse Resp SpO2 Weight   12/22/22 1657 -- 98.6  F (37  C) Oral (!) 125 12 -- --   12/22/22 1549 (!) 173/91 98.2  F (36.8  C) Oral (!) 129 18 100 % 103.4 kg (228 lb)        Physical Exam  General: Well appearing, pleasant female, resting on  exam bed  HEENT: No evidence of trauma.  Conjunctive are clear. Neck range of motion intact.  Nose and throat clear.  Respiratory: Good effort  Cardiovascular: Good distal perfusion  Gastrointestinal: Nondistended  Musculoskeletal: Atraumatic  Skin: Exposed skin clear.  Neurologic: Alert.  Psych:  Patient is cooperative, with normal affect.  Right great toe: Erythematous with purulence underneath.                  Emergency Department Course   ECG:  none    Imaging:  XR Foot Left 3 Views   Final Result   IMPRESSION: Marked soft tissue swelling involving the great toe and medial forefoot. Superficial gas bubble projecting plantar to the IP joint great toe may be related to an ulceration. No bony abnormality suggest osteomyelitis. Normal joint spaces.           Laboratory:  Labs Ordered and Resulted from Time of ED Arrival to Time of ED Departure   INR - Abnormal       Result Value    INR 1.19 (*)    BASIC METABOLIC PANEL - Abnormal    Sodium 135      Potassium 2.7 (*)     Chloride 101      Carbon Dioxide (CO2) 22      Anion Gap 12      Urea Nitrogen 7      Creatinine 0.60      Calcium 9.2      Glucose 134 (*)     GFR Estimate >90     CRP INFLAMMATION - Abnormal    CRP Inflammation 144.0 (*)    ERYTHROCYTE SEDIMENTATION RATE AUTO - Abnormal    Erythrocyte Sedimentation Rate 63 (*)    LACTIC ACID WHOLE BLOOD - Abnormal    Lactic Acid 3.2 (*)    KETONE BETA-HYDROXYBUTYRATE QUANTITATIVE, RAPID - Abnormal    Ketone (Beta-Hydroxybutyrate) Quantitative 1.1 (*)    PARTIAL THROMBOPLASTIN TIME - Normal    aPTT 28     CBC WITH PLATELETS AND DIFFERENTIAL    WBC Count 9.8      RBC Count 4.06      Hemoglobin 12.6      Hematocrit 36.8      MCV 91      MCH 31.0      MCHC 34.2      RDW 12.1      Platelet Count 291      % Neutrophils 78      % Lymphocytes 14      % Monocytes 6      % Eosinophils 1      % Basophils 0      % Immature Granulocytes 1      NRBCs per 100 WBC 0      Absolute Neutrophils 7.6      Absolute Lymphocytes 1.4       Absolute Monocytes 0.6      Absolute Eosinophils 0.1      Absolute Basophils 0.0      Absolute Immature Granulocytes 0.1      Absolute NRBCs 0.0     BLOOD CULTURE   BLOOD CULTURE   AEROBIC BACTERIAL CULTURE ROUTINE        Interventions:  Medications   vancomycin (VANCOCIN) 2,500 mg in 0.9% NaCl 500 mL intermittent infusion (2,500 mg Intravenous New Bag 12/22/22 1654)   ceFEPIme (MAXIPIME) 2 g vial to attach to  ml bag for ADULTS or 50 ml bag for PEDS (has no administration in time range)   0.9% sodium chloride BOLUS (1,000 mLs Intravenous New Bag 12/22/22 1652)   potassium chloride (KLOR-CON) Packet 40 mEq (40 mEq Oral Given 12/22/22 1649)        Impression & Plan      Medical Decision Making:  Guerita Gonzalez is a 57 year old female with a hx of diabetes, presents to the ER for evaluation of a foot wound.  See HPI.  She is mildly tachycardic.  She has an exam as above detailing significant infection.  Labs were obtained in triage.  She has no white count but her inflammatory markers are elevated.  Her lactate was elevated.  She was given fluids and antibiotics.  A repeat lactate is ordered and pending.  Her potassium was a little low so this was replaced.  She has an allergy to penicillin so cefepime was ordered.  Radiographs reveal no definite osteomyelitis but some gas in the subcutaneous tissues.  She does have an opening on her plantar and dorsal aspect of her toe.  I am not suspicious of DKA despite her beta hydroxybutyrate being a little bit elevated.  Blood cultures are pending.  Culture of the wound is pending.  Should be admitted to the hospital service for podiatry consult. I staffed the case with dr sarkar.      Discontinued and now arterial ultrasound for now as the patient has no signs of acute limb ischemia. She has a dp pulse and good color of her food. She could have some vascular insufficiency which could be evaluated inpatient or as an outpatient basis.    Diagnosis:    ICD-10-CM    1.  Diabetic foot infection (H)  E11.628     L08.9            Discharge Medications:  New Prescriptions    No medications on file        12/22/2022   Bora Barker PA-C Cyr, Bora SEARS PA-C  12/22/22 1731       Bora Barker PA-C  12/22/22 1732

## 2022-12-22 NOTE — H&P
[facility]    History and Physical - Hospitalist Service       Date of Admission:  12/22/2022    Assessment & Plan      Guerita Gonzalez is a 57 year old female admitted on 12/22/2022    Left great toe diabetic foot infection with concerns for early sepsis  Lactic acidosis  Presents with progressive edema, redness, and ulceration of her left big toe  X-ray reveals no evidence of osteomyelitis  There is also concerns for sepsis with a heart rate of 120s with a lactic acid of 3.2  Pulses are palpable dorsalis pedis  CRP under 44  Plan  - Admit to inpatient  - Continue cefepime and vancomycin.  Add Flagyl for anaerobic coverage  - MRI of the left foot  - N.p.o. at midnight consult podiatry  - Repeat lactic  - Continue IV fluids    Diabetes type 2 with neuropathy  Plan  - sliding scale insulin  - last a1c was only 6.4 in August  - repeat hemoglobin A1c given the ketones      Hypokalemia, hypophosphatemia, hypomagnesemia  Plan  - on replacement       Diet:  Diabetic then n.p.o. at midnight  DVT Prophylaxis: Pneumatic Compression Devices  Brown Catheter: Not present  Central Lines: None  Cardiac Monitoring: None  Code Status:   full code    Clinically Significant Risk Factors Present on Admission        # Hypokalemia: Lowest K = 2.7 mmol/L in last 2 days, will replace as needed  # Hyponatremia: Lowest Na = 135 mmol/L in last 2 days, will monitor as appropriate       # Coagulation Defect: INR = 1.19 (Ref range: 0.85 - 1.15) and/or PTT = 28 Seconds (Ref range: 22 - 38 Seconds), will monitor for bleeding    # Hypertension: home medication list includes antihypertensive(s)              Disposition Plan      Expected Discharge Date: 12/23/2022                The patient's care was discussed with the Patient.    Taye Avila,   Hospitalist Service  [facility]  Securely message with the Vocera Web Console (learn more here)  Text page via [employer] Paging/Directory          ______________________________________________________________________    Chief Complaint   Left great toe infection    History is obtained from the patient    History of Present Illness   Guerita Gonzalez is a 57 year old female with past medical history of type 2 diabetes, hypertension, hyperlipidemia, obesity who is sent to the emergency department from her primary care office due to a diabetic foot infection.  The patient states she originally developed wounds in her feet in September after using a treadmill.  One of the wounds on the right foot did resolve however the left wound remained present.  However the last week she noted spreading redness and of the top side of her foot.  She is a baseline neuropathy so does not have sensation in her feet.  She notes no fevers or chills. She saw urgent care and prescribed levofloxacin and Flagyl.  However she had worsening erythema and so saw her primary care doctor's office today and was sent immediately to the emergency room.                Review of Systems    The 10 point Review of Systems is negative other than noted in the HPI or here.     Past Medical History    I have reviewed this patient's medical history and updated it with pertinent information if needed.   Past Medical History:   Diagnosis Date     Benign essential hypertension      Diabetes (H)        Past Surgical History   I have reviewed this patient's surgical history and updated it with pertinent information if needed.  Past Surgical History:   Procedure Laterality Date     APPENDECTOMY         Social History   I have reviewed this patient's social history and updated it with pertinent information if needed.  Social History     Tobacco Use     Smoking status: Some Days     Years: 15.00     Types: Cigarettes   Substance Use Topics     Alcohol use: Yes     Drug use: Never       Family History   I have reviewed this patient's family history and updated it with pertinent information if  needed.  Family History   Problem Relation Age of Onset     Diabetes Father          Prior to Admission Medications   Prior to Admission Medications   Prescriptions Last Dose Informant Patient Reported? Taking?   Blood Glucose Monitoring Suppl ([employer] BLOOD GLUCOSE MONITOR) w/Device KIT   Yes No   Sig: Dispense Accucheck Deana glucose meter, test strips and lancets covered by the patient insurance. Test 2-4  times per day.   Cholecalciferol (VITAMIN D) 2000 units tablet   Yes No   Sig: Take 2,000 Units by mouth   amLODIPine (NORVASC) 10 MG tablet   Yes No   Sig: Take 10 mg by mouth daily.   aspirin 81 MG EC tablet   Yes No   Sig: Take 81 mg by mouth   blood glucose monitoring ([employer] TEST STRIPS) test strip   Yes No   Sig: Dispense test strips covered by the patient insurance. Test 2-4 times per day.   cefdinir (OMNICEF) 300 MG capsule   No No   Sig: Take 1 capsule (300 mg) by mouth 2 times daily   fluticasone (FLONASE) 50 MCG/ACT nasal spray   No No   Sig: Spray 2 sprays in nostril daily.   Patient not taking: Reported on 6/17/2018   glimepiride (AMARYL) 2 MG tablet   Yes No   Sig: Take 2 mg by mouth   losartan (COZAAR) 25 MG tablet   Yes No   Sig: Take 25 mg by mouth   metFORMIN (GLUCOPHAGE) 1000 MG tablet   Yes No   Sig: Take 1,000 mg by mouth   oxyCODONE (ROXICODONE) 5 MG tablet   No No   Sig: Take 1 tablet (5 mg) by mouth every 6 hours as needed for pain   simvastatin (ZOCOR) 10 MG tablet   Yes No   Sig: Take 10 mg by mouth      Facility-Administered Medications: None     Allergies   Allergies   Allergen Reactions     Penicillins      hives       Physical Exam   Vital Signs: Temp: 98.6  F (37  C) Temp src: Oral BP: (!) 173/91 Pulse: (!) 125   Resp: 12 SpO2: 100 % O2 Device: None (Room air)    Weight: 228 lbs 0 oz    General: Well appearing, pleasant female, resting on exam bed  HEENT: No evidence of trauma.  Conjunctive are clear. Neck range of motion intact.  Nose and throat clear.  Respiratory: Good  effort  Cardiovascular: Good distal perfusion  Gastrointestinal: Nondistended  Musculoskeletal: Atraumatic  Skin: Exposed skin clear.  Neurologic: Alert.  Psych:  Patient is cooperative, with normal affect.  Right great toe: Erythematous with purulence underneath.          Data   Data reviewed today: I reviewed all medications, new labs and imaging results over the last 24 hours. I personally reviewed the foot image(s) showing no ostemyelitis, large amount of soft tissue swelling.    Recent Labs   Lab 12/22/22  1557 12/22/22  1556   WBC  --  9.8   HGB  --  12.6   MCV  --  91   PLT  --  291   INR 1.19*  --    NA  --  135   POTASSIUM  --  2.7*   CHLORIDE  --  101   CO2  --  22   BUN  --  7   CR  --  0.60   ANIONGAP  --  12   GREGORY  --  9.2   GLC  --  134*     Most Recent 3 CBC's:Recent Labs   Lab Test 12/22/22  1556 06/15/21  1250   WBC 9.8 15.2*   HGB 12.6 14.4   MCV 91 90    341     Most Recent 3 BMP's:Recent Labs   Lab Test 12/22/22  1556 06/15/21  1250    138   POTASSIUM 2.7* 3.4   CHLORIDE 101 103   CO2 22 22   BUN 7 7   CR 0.60 0.58   ANIONGAP 12 13   GREGORY 9.2 9.6   * 185*     Most Recent 2 LFT's:Recent Labs   Lab Test 06/15/21  1250   AST 55*   ALT 72*   ALKPHOS 94   BILITOTAL 0.6

## 2022-12-23 LAB
ALBUMIN SERPL-MCNC: 2.7 G/DL (ref 3.4–5)
ALP SERPL-CCNC: 66 U/L (ref 40–150)
ALT SERPL W P-5'-P-CCNC: 12 U/L (ref 0–50)
ANION GAP SERPL CALCULATED.3IONS-SCNC: 11 MMOL/L (ref 3–14)
AST SERPL W P-5'-P-CCNC: 14 U/L (ref 0–45)
BASOPHILS # BLD AUTO: 0 10E3/UL (ref 0–0.2)
BASOPHILS NFR BLD AUTO: 1 %
BILIRUB SERPL-MCNC: 1 MG/DL (ref 0.2–1.3)
BUN SERPL-MCNC: 4 MG/DL (ref 7–30)
CALCIUM SERPL-MCNC: 8.5 MG/DL (ref 8.5–10.1)
CHLORIDE BLD-SCNC: 107 MMOL/L (ref 94–109)
CO2 SERPL-SCNC: 20 MMOL/L (ref 20–32)
CREAT SERPL-MCNC: 0.51 MG/DL (ref 0.52–1.04)
EOSINOPHIL # BLD AUTO: 0.2 10E3/UL (ref 0–0.7)
EOSINOPHIL NFR BLD AUTO: 3 %
ERYTHROCYTE [DISTWIDTH] IN BLOOD BY AUTOMATED COUNT: 12.3 % (ref 10–15)
GFR SERPL CREATININE-BSD FRML MDRD: >90 ML/MIN/1.73M2
GLUCOSE BLD-MCNC: 153 MG/DL (ref 70–99)
GLUCOSE BLDC GLUCOMTR-MCNC: 161 MG/DL (ref 70–99)
GLUCOSE BLDC GLUCOMTR-MCNC: 190 MG/DL (ref 70–99)
HCT VFR BLD AUTO: 30.9 % (ref 35–47)
HGB BLD-MCNC: 10.5 G/DL (ref 11.7–15.7)
IMM GRANULOCYTES # BLD: 0 10E3/UL
IMM GRANULOCYTES NFR BLD: 1 %
LYMPHOCYTES # BLD AUTO: 1.2 10E3/UL (ref 0.8–5.3)
LYMPHOCYTES NFR BLD AUTO: 16 %
MAGNESIUM SERPL-MCNC: 2.3 MG/DL (ref 1.6–2.3)
MCH RBC QN AUTO: 30.3 PG (ref 26.5–33)
MCHC RBC AUTO-ENTMCNC: 34 G/DL (ref 31.5–36.5)
MCV RBC AUTO: 89 FL (ref 78–100)
MONOCYTES # BLD AUTO: 0.5 10E3/UL (ref 0–1.3)
MONOCYTES NFR BLD AUTO: 7 %
NEUTROPHILS # BLD AUTO: 5.4 10E3/UL (ref 1.6–8.3)
NEUTROPHILS NFR BLD AUTO: 72 %
NRBC # BLD AUTO: 0 10E3/UL
NRBC BLD AUTO-RTO: 0 /100
PHOSPHATE SERPL-MCNC: 2.6 MG/DL (ref 2.5–4.5)
PLATELET # BLD AUTO: 253 10E3/UL (ref 150–450)
POTASSIUM BLD-SCNC: 3.3 MMOL/L (ref 3.4–5.3)
POTASSIUM BLD-SCNC: 3.5 MMOL/L (ref 3.4–5.3)
POTASSIUM BLD-SCNC: 3.6 MMOL/L (ref 3.4–5.3)
PROT SERPL-MCNC: 6.9 G/DL (ref 6.8–8.8)
RBC # BLD AUTO: 3.47 10E6/UL (ref 3.8–5.2)
SODIUM SERPL-SCNC: 138 MMOL/L (ref 133–144)
WBC # BLD AUTO: 7.3 10E3/UL (ref 4–11)

## 2022-12-23 PROCEDURE — 250N000013 HC RX MED GY IP 250 OP 250 PS 637: Performed by: HOSPITALIST

## 2022-12-23 PROCEDURE — 36415 COLL VENOUS BLD VENIPUNCTURE: CPT | Performed by: HOSPITALIST

## 2022-12-23 PROCEDURE — 83735 ASSAY OF MAGNESIUM: CPT | Performed by: STUDENT IN AN ORGANIZED HEALTH CARE EDUCATION/TRAINING PROGRAM

## 2022-12-23 PROCEDURE — 84132 ASSAY OF SERUM POTASSIUM: CPT | Performed by: HOSPITALIST

## 2022-12-23 PROCEDURE — 99232 SBSQ HOSP IP/OBS MODERATE 35: CPT | Performed by: STUDENT IN AN ORGANIZED HEALTH CARE EDUCATION/TRAINING PROGRAM

## 2022-12-23 PROCEDURE — 85025 COMPLETE CBC W/AUTO DIFF WBC: CPT | Performed by: HOSPITALIST

## 2022-12-23 PROCEDURE — 250N000011 HC RX IP 250 OP 636: Performed by: HOSPITALIST

## 2022-12-23 PROCEDURE — 99253 IP/OBS CNSLTJ NEW/EST LOW 45: CPT | Mod: 57 | Performed by: PODIATRIST

## 2022-12-23 PROCEDURE — 84132 ASSAY OF SERUM POTASSIUM: CPT | Performed by: STUDENT IN AN ORGANIZED HEALTH CARE EDUCATION/TRAINING PROGRAM

## 2022-12-23 PROCEDURE — 250N000012 HC RX MED GY IP 250 OP 636 PS 637: Performed by: HOSPITALIST

## 2022-12-23 PROCEDURE — 36415 COLL VENOUS BLD VENIPUNCTURE: CPT | Performed by: STUDENT IN AN ORGANIZED HEALTH CARE EDUCATION/TRAINING PROGRAM

## 2022-12-23 PROCEDURE — 250N000013 HC RX MED GY IP 250 OP 250 PS 637: Performed by: STUDENT IN AN ORGANIZED HEALTH CARE EDUCATION/TRAINING PROGRAM

## 2022-12-23 PROCEDURE — 84100 ASSAY OF PHOSPHORUS: CPT | Performed by: STUDENT IN AN ORGANIZED HEALTH CARE EDUCATION/TRAINING PROGRAM

## 2022-12-23 PROCEDURE — A9585 GADOBUTROL INJECTION: HCPCS | Performed by: EMERGENCY MEDICINE

## 2022-12-23 PROCEDURE — 120N000001 HC R&B MED SURG/OB

## 2022-12-23 PROCEDURE — 255N000002 HC RX 255 OP 636: Performed by: EMERGENCY MEDICINE

## 2022-12-23 PROCEDURE — 258N000003 HC RX IP 258 OP 636: Performed by: HOSPITALIST

## 2022-12-23 RX ORDER — AMOXICILLIN 250 MG
1 CAPSULE ORAL 2 TIMES DAILY PRN
Status: DISCONTINUED | OUTPATIENT
Start: 2022-12-23 | End: 2022-12-26 | Stop reason: HOSPADM

## 2022-12-23 RX ORDER — LIDOCAINE 40 MG/G
CREAM TOPICAL
Status: DISCONTINUED | OUTPATIENT
Start: 2022-12-23 | End: 2022-12-24

## 2022-12-23 RX ORDER — AMOXICILLIN 250 MG
2 CAPSULE ORAL 2 TIMES DAILY PRN
Status: DISCONTINUED | OUTPATIENT
Start: 2022-12-23 | End: 2022-12-26 | Stop reason: HOSPADM

## 2022-12-23 RX ORDER — POTASSIUM CHLORIDE 1500 MG/1
40 TABLET, EXTENDED RELEASE ORAL ONCE
Status: COMPLETED | OUTPATIENT
Start: 2022-12-23 | End: 2022-12-23

## 2022-12-23 RX ADMIN — POTASSIUM CHLORIDE 40 MEQ: 1500 TABLET, EXTENDED RELEASE ORAL at 04:57

## 2022-12-23 RX ADMIN — MAGNESIUM SULFATE HEPTAHYDRATE 4 G: 40 INJECTION, SOLUTION INTRAVENOUS at 01:23

## 2022-12-23 RX ADMIN — GADOBUTROL 10 ML: 604.72 INJECTION INTRAVENOUS at 00:54

## 2022-12-23 RX ADMIN — INSULIN ASPART 1 UNITS: 100 INJECTION, SOLUTION INTRAVENOUS; SUBCUTANEOUS at 18:29

## 2022-12-23 RX ADMIN — POTASSIUM & SODIUM PHOSPHATES POWDER PACK 280-160-250 MG 2 PACKET: 280-160-250 PACK at 01:23

## 2022-12-23 RX ADMIN — CEFEPIME HYDROCHLORIDE 2 G: 2 INJECTION, POWDER, FOR SOLUTION INTRAVENOUS at 22:00

## 2022-12-23 RX ADMIN — METRONIDAZOLE 500 MG: 500 INJECTION, SOLUTION INTRAVENOUS at 11:01

## 2022-12-23 RX ADMIN — METRONIDAZOLE 500 MG: 500 INJECTION, SOLUTION INTRAVENOUS at 23:03

## 2022-12-23 RX ADMIN — POTASSIUM & SODIUM PHOSPHATES POWDER PACK 280-160-250 MG 2 PACKET: 280-160-250 PACK at 13:46

## 2022-12-23 RX ADMIN — TRAZODONE HYDROCHLORIDE 50 MG: 50 TABLET ORAL at 23:07

## 2022-12-23 RX ADMIN — VANCOMYCIN HYDROCHLORIDE 1250 MG: 10 INJECTION, POWDER, LYOPHILIZED, FOR SOLUTION INTRAVENOUS at 06:36

## 2022-12-23 RX ADMIN — VANCOMYCIN HYDROCHLORIDE 1250 MG: 10 INJECTION, POWDER, LYOPHILIZED, FOR SOLUTION INTRAVENOUS at 19:08

## 2022-12-23 RX ADMIN — CEFEPIME HYDROCHLORIDE 2 G: 2 INJECTION, POWDER, FOR SOLUTION INTRAVENOUS at 10:22

## 2022-12-23 RX ADMIN — POTASSIUM & SODIUM PHOSPHATES POWDER PACK 280-160-250 MG 2 PACKET: 280-160-250 PACK at 07:55

## 2022-12-23 ASSESSMENT — ACTIVITIES OF DAILY LIVING (ADL)
ADLS_ACUITY_SCORE: 35

## 2022-12-23 NOTE — CONSULTS
PATIENT HISTORY:  Guerita Gonzalez is a 57 year old female who was admitted for worsen left diabetic foot infection.      I was asked to see Guerita Gonzalez  by  for diabetic left foot infection.    Patient was seen at bedside. Notes she noticed the toe left great toe being red on Monday about 5 days ago. She was started on oral antibotics but the toe got worse. She states she was seen at her doctors office and was directly admitted from there. Her  is at bedside. Notes it is not really painful as she has neuropathy.  Has been draining a lot.  Is diabetic.      Review of Systems:  Patient denies fever, chills, rash, wound, stiffness, limping,  weakness, heart burn, blood in stool, chest pain with activity, calf pain when walking, shortness of breath with activity, chronic cough, easy bleeding/bruising, swelling of ankles, excessive thirst, fatigue, depression, anxiety.  Patient admits to numbness.     PAST MEDICAL HISTORY:   Past Medical History:   Diagnosis Date     Benign essential hypertension      Diabetes (H)         PAST SURGICAL HISTORY:   Past Surgical History:   Procedure Laterality Date     APPENDECTOMY          MEDICATIONS:   Current Facility-Administered Medications:      acetaminophen (TYLENOL) tablet 650 mg, 650 mg, Oral, Q6H PRN **OR** acetaminophen (TYLENOL) Suppository 650 mg, 650 mg, Rectal, Q6H PRN, Taye Avila DO     ceFEPIme (MAXIPIME) 2 g vial to attach to  ml bag for ADULTS or 50 ml bag for PEDS, 2 g, Intravenous, Q12H, Taye Avila DO, Stopped at 12/23/22 0848     glucose gel 15-30 g, 15-30 g, Oral, Q15 Min PRN **OR** dextrose 50 % injection 25-50 mL, 25-50 mL, Intravenous, Q15 Min PRN **OR** glucagon injection 1 mg, 1 mg, Subcutaneous, Q15 Min PRN, Taye Avila DO     insulin aspart (NovoLOG) injection (RAPID ACTING), 1-7 Units, Subcutaneous, TID AC, Taye Avila DO     insulin aspart (NovoLOG) injection (RAPID  ACTING), 1-5 Units, Subcutaneous, At Bedtime, Taye Avila DO     lidocaine (LMX4) cream, , Topical, Q1H PRN, Taye Avila DO     lidocaine 1 % 0.1-1 mL, 0.1-1 mL, Other, Q1H PRN, Taye Avila DO     melatonin tablet 1 mg, 1 mg, Oral, At Bedtime PRN, Taye Avila DO     metroNIDAZOLE (FLAGYL) infusion 500 mg, 500 mg, Intravenous, Q12H, Taye Avila DO, Stopped at 12/23/22 0027     ondansetron (ZOFRAN ODT) ODT tab 4 mg, 4 mg, Oral, Q6H PRN **OR** ondansetron (ZOFRAN) injection 4 mg, 4 mg, Intravenous, Q6H PRN, Taye Avila DO     potassium & sodium phosphates (NEUTRA-PHOS) Packet 2 packet, 2 packet, Oral or Feeding Tube, Q4H, Taye Avila DO, 2 packet at 12/23/22 0755     senna-docusate (SENOKOT-S/PERICOLACE) 8.6-50 MG per tablet 1 tablet, 1 tablet, Oral, BID PRN **OR** senna-docusate (SENOKOT-S/PERICOLACE) 8.6-50 MG per tablet 2 tablet, 2 tablet, Oral, BID PRN, Taye Avila DO     sodium chloride (PF) 0.9% PF flush 3 mL, 3 mL, Intracatheter, Q8H, Taye Avila DO     sodium chloride (PF) 0.9% PF flush 3 mL, 3 mL, Intracatheter, q1 min prn, Taye Avila DO     sodium chloride 0.9% infusion, , Intravenous, Continuous, Taye Avila DO, Last Rate: 100 mL/hr at 12/22/22 2100, New Bag at 12/22/22 2100     vancomycin (VANCOCIN) 1,250 mg in 0.9% NaCl 250 mL intermittent infusion, 1,250 mg, Intravenous, Q12H, Taye Avila DO, Stopped at 12/23/22 0835     ALLERGIES:    Allergies   Allergen Reactions     Penicillins      hives        SOCIAL HISTORY:   Social History     Socioeconomic History     Marital status:      Spouse name: Not on file     Number of children: Not on file     Years of education: Not on file     Highest education level: Not on file   Occupational History     Not on file   Tobacco Use     Smoking status: Some Days     Years: 15.00     Types:  "Cigarettes     Smokeless tobacco: Not on file   Substance and Sexual Activity     Alcohol use: Yes     Drug use: Never     Sexual activity: Not on file   Other Topics Concern     Not on file   Social History Narrative     Not on file     Social Determinants of Health     Financial Resource Strain: Not on file   Food Insecurity: Not on file   Transportation Needs: Not on file   Physical Activity: Not on file   Stress: Not on file   Social Connections: Not on file   Intimate Partner Violence: Not on file   Housing Stability: Not on file        FAMILY HISTORY:   Family History   Problem Relation Age of Onset     Diabetes Father         EXAM:Vitals: BP (!) 154/88 (BP Location: Right arm, Patient Position: Sitting)   Pulse 89   Temp 98.5  F (36.9  C) (Oral)   Resp 18   Ht 1.651 m (5' 5\")   Wt 97.5 kg (215 lb)   LMP  (LMP Unknown)   SpO2 98%   BMI 35.78 kg/m    BMI= Body mass index is 35.78 kg/m .    LABS:    WBC   Date Value Ref Range Status   06/15/2021 15.2 (H) 4.0 - 11.0 10e9/L Final     WBC Count   Date Value Ref Range Status   12/23/2022 7.3 4.0 - 11.0 10e3/uL Final     HA1C: 6.4 (12/22/2022)    INR: 1.19    CRP: 144    ESR:3    General appearance: Patient is alert and fully cooperative with history & exam.  No sign of distress is noted during the visit.      Psychiatric: Affect is pleasant & appropriate.  Patient appears motivated to improve health.       Respiratory: Breathing is regular & unlabored while sitting.      HEENT: Hearing is intact to spoken word.  Speech is clear.  No gross evidence of visual impairment that would impact ambulation.       Dermatologic:   Full thickness ulceration to plantar left great toe measuring roughly 1.0cm x 1.0cm x 2.0cm (depth). It does probe to dorsal aspect of the left great toe and there is purulent blister noted to dorsal left great toe. Minimal purulent drainage noted. Redness to the dorsal left foot.      Vascular: DP & PT pulses are faintly bilaterally.  edema " and varicosities noted.  CFT and skin temperature is normal to both lower extremities.       Neurologic: Lower extremity sensation is diminished to feet.      Musculoskeletal: Patient is ambulatory without assistive device or brace.  No gross ankle deformity noted.  No foot or ankle joint effusion is noted.      IMAGING: left foot xray -  I personally reviewed images - Marked soft tissue swelling involving the great toe and medial forefoot. Superficial gas bubble projecting plantar to the IP joint great toe may be related to an ulceration. No bony abnormality suggest osteomyelitis. Normal joint spaces.    MRI left foot - Soft tissue ulceration along the plantar aspect of the great toe underlying the first interphalangeal joint which appears to communicate with a fluid collection which extends dorsally and medially to the first interphalangeal joint and proximal   phalanx measuring roughly 10 x 13 x 15 mm, with mild edema and enhancement of the first proximal and distal phalanxes also present. Persistent with osteomyelitis of the first distal and proximal phalanx centered at the first interphalangeal joint with a   developing abscess/phlegmon which communicates with the soft tissue ulcer underlying the first interphalangeal joint.  2.  Inflammation and edema of the extensor pollicis longus tendon at the level of the first interphalangeal joint, favored reflect infectious tenosynovitis, no evidence of shraddha disruption of the tendon is seen at this time.     ASSESSMENT: 57 yr old diabetic female with ulceration, cellulitis, and osteomyelitis left great toe.      PLAN:  Reviewed patient's chart in [facility].  -reviewed and discussed MRI results.    -discussed that there is bone infection in the left great toe. Explained that with bone infection, antibiotics don't really get rid of it and the bone needs to be surgically removed.   -recommend amputation of the left great toe. Discussed that sometimes with these infections,  they may require more than one surgery to get the infection under control.   -surgery scheduled for tomorrow at 10:30am.   -NPO after midnight tonight.   -modcard diet at this time.   -Keep foot and dressing dry.     Belén Lawrence DPM, Podiatry/Foot and Ankle Surgery    9:48 AM

## 2022-12-23 NOTE — PHARMACY-ADMISSION MEDICATION HISTORY
Pharmacy Medication History  Admission medication history interview status for the 12/22/2022  admission is complete. See EPIC admission navigator for prior to admission medications     Location of Interview: Patient room  Medication history sources: Patient and Surescripts    Significant changes made to the medication list:  Added: vitamin B12, metformin, levofloxacin  Discontinued: flonase    In the past week, patient estimated taking medication this percent of the time: greater than 90%    Additional medication history information:   none    Medication reconciliation completed by provider prior to medication history? No    Time spent in this activity: 20 minutes    Prior to Admission medications    Medication Sig Last Dose Taking? Auth Provider Long Term End Date   amLODIPine (NORVASC) 10 MG tablet Take 10 mg by mouth daily. 12/22/2022 at am Yes Reported, Patient     aspirin 81 MG EC tablet Take 81 mg by mouth every evening 12/21/2022 at pm Yes Reported, Patient     Cholecalciferol (VITAMIN D) 2000 units tablet Take 2,000 Units by mouth daily 12/22/2022 at am Yes Reported, Patient     cyanocobalamin (VITAMIN B-12) 1000 MCG tablet Take 1,000 mcg by mouth every 28 (twenty-eight) days Unknown Yes Unknown, Entered By History     glimepiride (AMARYL) 2 MG tablet Take 2 mg by mouth every morning (before breakfast) 12/22/2022 at am Yes Reported, Patient Yes    levofloxacin (LEVAQUIN) 500 MG tablet Take 750 mg by mouth daily For 14 days    Started: 12/20/22 pm 12/22/2022 at am Yes Unknown, Entered By History  1/3/2023   losartan (COZAAR) 25 MG tablet Take 25 mg by mouth daily 12/22/2022 at am Yes Reported, Patient Yes    metFORMIN (GLUCOPHAGE) 1000 MG tablet Take 1,000 mg by mouth 2 times daily (with meals) 12/22/2022 at am Yes Reported, Patient Yes    metroNIDAZOLE (FLAGYL) 500 MG tablet Take 500 mg by mouth 2 times daily For 14 days    Started: 12/20/22 pm 12/22/2022 at am Yes Unknown, Entered By History  1/3/2023    simvastatin (ZOCOR) 10 MG tablet Take 10 mg by mouth At Bedtime 12/21/2022 at pm Yes Reported, Patient Yes    blood glucose monitoring ([employer] TEST STRIPS) test strip Dispense test strips covered by the patient insurance. Test 2-4 times per day.   Reported, Patient     Blood Glucose Monitoring Suppl ([employer] BLOOD GLUCOSE MONITOR) w/Device KIT Dispense Accucheck Deana glucose meter, test strips and lancets covered by the patient insurance. Test 2-4  times per day.   Reported, Patient         The information provided in this note is only as accurate as the sources available at the time of update(s)

## 2022-12-23 NOTE — PHARMACY-VANCOMYCIN DOSING SERVICE
Pharmacy Vancomycin Initial Note  Date of Service 2022  Patient's  1965  57 year old, female    Indication: Abscess and Skin and Soft Tissue Infection    Current estimated CrCl = Estimated Creatinine Clearance: 122.4 mL/min (based on SCr of 0.6 mg/dL).    Creatinine for last 3 days  2022:  3:56 PM Creatinine 0.60 mg/dL    Recent Vancomycin Level(s) for last 3 days  No results found for requested labs within last 72 hours.      Vancomycin IV Administrations (past 72 hours)                   vancomycin (VANCOCIN) 2,500 mg in 0.9% NaCl 500 mL intermittent infusion (mg) 2,500 mg New Bag 22 1654                Nephrotoxins and other renal medications (From now, onward)    Start     Dose/Rate Route Frequency Ordered Stop    22 0600  vancomycin (VANCOCIN) 1,250 mg in 0.9% NaCl 250 mL intermittent infusion         1,250 mg  over 90 Minutes Intravenous EVERY 12 HOURS 22 2131            Contrast Orders - past 72 hours (72h ago, onward)    None          [employer] Prediction of Planned Initial Vancomycin Regimen  Loading dose: 2500 mg IV x 1  Regimen: 1250 mg IV every 12 hours.  Start time: 0600 on 2022  Exposure target: AUC24 (range)400-600 mg/L.hr   AUC24,ss: 465 mg/L.hr  Probability of AUC24 > 400: 65 %  Ctrough,ss: 14.4 mg/L  Probability of Ctrough,ss > 20: 25 %  Probability of nephrotoxicity (Lodise ALAINA ): 10 %        Plan:  1. Start vancomycin 1250 mg IV q12h.   2. Vancomycin monitoring method: AUC  3. Vancomycin therapeutic monitoring goal: 400-600 mg*h/L  4. Pharmacy will check vancomycin levels as appropriate in 1-3 Days.    5. Serum creatinine levels will be ordered daily for the first week of therapy and at least twice weekly for subsequent weeks.      Irasema Puckett, PharmD, BCPS

## 2022-12-23 NOTE — ED NOTES
"[facility]  ED Nurse Handoff Report    ED Chief complaint: Wound Check      ED Diagnosis:   Final diagnoses:   Diabetic foot infection (H)       Code Status: Full Code    Allergies:   Allergies   Allergen Reactions     Penicillins      hives       Patient Story: Patient noticed a wound on the bottom of her foot, had it assessed at the clinic and was advised to come to the ED for further evaluation of her diabetic foot ulcers.        Focused Assessment:  A&Ox4, dorsum left great toe erythema, edema, purulent drainage with blister. plantar aspect left great toe tunneling wound no drainage. Initially tachycardiac resolved after 1L NS. VS stable. Lungs clear. NS. Elevated lactate. IV vancomycin infusing. Independent in cares. IV right ac.    Treatments and/or interventions provided: labs, blood cultures x2, xray, IV antibiotics  Patient's response to treatments and/or interventions: stable    To be done/followed up on inpatient unit:  admission orders    Does this patient have any cognitive concerns?: none    Activity level - Baseline/Home:  Independent  Activity Level - Current:   Independent    Patient's Preferred language: English   Needed?: No    Isolation: None  Infection: Not Applicable  Patient tested for COVID 19 prior to admission: NO  Bariatric?: No    Vital Signs:   Vitals:    12/22/22 1549 12/22/22 1657 12/22/22 1700 12/22/22 1800   BP: (!) 173/91 (!) 160/88 (!) 160/88 128/84   Pulse: (!) 129 (!) 125 114 105   Resp: 18 12 13 12   Temp: 98.2  F (36.8  C) 98.6  F (37  C)     TempSrc: Oral Oral     SpO2: 100%      Weight: 103.4 kg (228 lb)      Height:    1.651 m (5' 5\")     Labs Ordered and Resulted from Time of ED Arrival to Time of ED Departure   INR - Abnormal       Result Value    INR 1.19 (*)    BASIC METABOLIC PANEL - Abnormal    Sodium 135      Potassium 2.7 (*)     Chloride 101      Carbon Dioxide (CO2) 22      Anion Gap 12      Urea Nitrogen 7      Creatinine 0.60      " Calcium 9.2      Glucose 134 (*)     GFR Estimate >90     CRP INFLAMMATION - Abnormal    CRP Inflammation 144.0 (*)    ERYTHROCYTE SEDIMENTATION RATE AUTO - Abnormal    Erythrocyte Sedimentation Rate 63 (*)    LACTIC ACID WHOLE BLOOD - Abnormal    Lactic Acid 3.2 (*)    KETONE BETA-HYDROXYBUTYRATE QUANTITATIVE, RAPID - Abnormal    Ketone (Beta-Hydroxybutyrate) Quantitative 1.1 (*)    PARTIAL THROMBOPLASTIN TIME - Normal    aPTT 28     CBC WITH PLATELETS AND DIFFERENTIAL    WBC Count 9.8      RBC Count 4.06      Hemoglobin 12.6      Hematocrit 36.8      MCV 91      MCH 31.0      MCHC 34.2      RDW 12.1      Platelet Count 291      % Neutrophils 78      % Lymphocytes 14      % Monocytes 6      % Eosinophils 1      % Basophils 0      % Immature Granulocytes 1      NRBCs per 100 WBC 0      Absolute Neutrophils 7.6      Absolute Lymphocytes 1.4      Absolute Monocytes 0.6      Absolute Eosinophils 0.1      Absolute Basophils 0.0      Absolute Immature Granulocytes 0.1      Absolute NRBCs 0.0     LACTIC ACID WHOLE BLOOD   BLOOD CULTURE   BLOOD CULTURE   AEROBIC BACTERIAL CULTURE ROUTINE         Cardiac Rhythm:     Was the PSS-3 completed:   Yes  What interventions are required if any?               Family Comments:  at bedside.   OBS brochure/video discussed/provided to patient/family: N/A              Name of person given brochure if not patient:               Relationship to patient:     For the majority of the shift this patient's behavior was Green.   Behavioral interventions performed were .    ED NURSE PHONE NUMBER: *[id number]

## 2022-12-23 NOTE — PROGRESS NOTES
[facility]    Medicine Progress Note - Hospitalist Service    Date of Admission:  12/22/2022  Date of Service: 12/23/2022    Assessment & Plan      Guerita Gonzalez is a 57 year old female admitted on 12/22/2022    Left great toe diabetic foot infection with concerns for early sepsis  Lactic acidosis  Presents with progressive edema, redness, and ulceration of her left big toe  X-ray reveals no evidence of osteomyelitis  There is also concerns for sepsis with a heart rate of 120s with a lactic acid of 3.2  Pulses are palpable dorsalis pedis  CRP under 44  Plan  - Continue cefepime and vancomycin + Flagyl for anaerobic coverage  - MRI of the left foot -> Soft tissue ulceration along the plantar aspect of the great toe underlying the first interphalangeal joint which appears to communicate with a fluid collection which extends dorsally and medially to the first interphalangeal joint and proximal  phalanx measuring roughly 10 x 13 x 15 mm, with mild edema and enhancement of the first proximal and distal phalanxes also present. Persistent with osteomyelitis.  - Podiatry consulted -> surgery scheduled for tomorrow at 10:30am and NPO after midnight tonight  - Continue IV fluids  -- Pain control PRN  -- Follow vitals / temp  -- Medically cleared for surgery    Diabetes type 2 with neuropathy  Plan  - sliding scale insulin  - last a1c was only 6.4 in August  - repeat hemoglobin A1c given the ketones      Hypokalemia, hypophosphatemia, hypomagnesemia  Plan  - on replacement protocols         Diet: Moderate Consistent Carb (60 g CHO per Meal) Diet    DVT Prophylaxis: Pneumatic Compression Devices  Brown Catheter: Not present  Central Lines: None  Cardiac Monitoring: None  Code Status: Full Code      Disposition Plan      Expected Discharge Date: 12/26/2022        Discharge Comments: Podiatry consult pending        The patient's care was discussed with the Bedside Nurse and Patient.    Andry Garza,  "MD  Hospitalist Service  [facility]  Securely message with the [employer] Web Console (learn more here)  Text page via [employer] Paging/Directory         Clinically Significant Risk Factors Present on Admission        # Hypokalemia: Lowest K = 2.7 mmol/L in last 2 days, will replace as needed  # Hyponatremia: Lowest Na = 135 mmol/L in last 2 days, will monitor as appropriate   # Hypercalcemia: corrected calcium is >10.1, will monitor as appropriate  # Hypomagnesemia: Lowest Mg = 1.1 mg/dL in last 2 days, will replace as needed   # Hypoalbuminemia: Lowest albumin = 2.7 g/dL at 12/23/2022  5:51 AM, will monitor as appropriate  # Coagulation Defect: INR = 1.19 (Ref range: 0.85 - 1.15) and/or PTT = 28 Seconds (Ref range: 22 - 38 Seconds), will monitor for bleeding    # Hypertension: home medication list includes antihypertensive(s)      # Obesity: Estimated body mass index is 35.78 kg/m  as calculated from the following:    Height as of this encounter: 1.651 m (5' 5\").    Weight as of this encounter: 97.5 kg (215 lb).           ______________________________________________________________________    Interval History     No pain  No CP/SOB  No nausea / vomiting  No fevers or chills    Data reviewed today: I reviewed all medications, new labs and imaging results over the last 24 hours. I personally reviewed no images or EKG's today.    Physical Exam   Vital Signs: Temp: 98.5  F (36.9  C) Temp src: Oral BP: (!) 154/88 Pulse: 89   Resp: 18 SpO2: 98 % O2 Device: None (Room air)    Weight: 215 lbs 0 oz     Constitutional: awake, alert, cooperative, no apparent distress.   Respiratory: CTABL   Cardiovascular: RRR with no m/r/g   GI: Normal bowel sounds, soft, non-distended, non-tender.  Skin: normal skin color, texture, turgor   Musculoskeletal: There is no redness, warmth, or swelling of the joints. There is wound over the left great toe.  There is blistering and skin breakdown.  There is discharge from the " wound  Neurologic: Awake, alert, oriented to name, place and time. Cranial nerves II-XII are grossly intact. Motor is 5 out of 5 bilaterally. Sensory is intact.   Neuropsychiatric: normal mood and affect    ----------------------------------------------------------------------------------------    Data   Recent Labs   Lab 12/23/22  0930 12/23/22  0551 12/23/22  0358 12/22/22  2133 12/22/22  1557 12/22/22  1556   WBC  --  7.3  --   --   --  9.8   HGB  --  10.5*  --   --   --  12.6   MCV  --  89  --   --   --  91   PLT  --  253  --   --   --  291   INR  --   --   --   --  1.19*  --    NA  --  138  --   --   --  135   POTASSIUM 3.6 3.5 3.3*  --   --  2.7*   CHLORIDE  --  107  --   --   --  101   CO2  --  20  --   --   --  22   BUN  --  4*  --   --   --  7   CR  --  0.51*  --   --   --  0.60   ANIONGAP  --  11  --   --   --  12   GREGORY  --  8.5  --   --   --  9.2   GLC  --  153*  --  129*  --  134*   ALBUMIN  --  2.7*  --   --   --   --    PROTTOTAL  --  6.9  --   --   --   --    BILITOTAL  --  1.0  --   --   --   --    ALKPHOS  --  66  --   --   --   --    ALT  --  12  --   --   --   --    AST  --  14  --   --   --   --      Recent Results (from the past 24 hour(s))   XR Foot Left 3 Views    Narrative    EXAM: XR FOOT LEFT G/E 3 VIEWS  LOCATION: [facility]  DATE/TIME: 12/22/2022 4:34 PM    INDICATION: Swelling, infection  COMPARISON: None.      Impression    IMPRESSION: Marked soft tissue swelling involving the great toe and medial forefoot. Superficial gas bubble projecting plantar to the IP joint great toe may be related to an ulceration. No bony abnormality suggest osteomyelitis. Normal joint spaces.   MR Foot Left w/o & w Contrast    Narrative    EXAM: MR FOOT LEFT W/O and W CONTRAST  LOCATION: [facility]  DATE/TIME: 12/23/2022 12:58 AM    INDICATION: diabetic foot infection  COMPARISON:  x-rays dated 12/22/2022  TECHNIQUE: Routine. Additional postgadolinium T1  sequences were obtained.  IV CONTRAST: 10 mL GADAVIST    FINDINGS: Multiple sequences are degraded due to artifact.    JOINTS AND BONES:   -There is subtle increased bone marrow edema and enhancement seen in the first proximal phalanx and first distal phalanx centered at the first interphalangeal joint. Remainder the bony structures are unremarkable.    Degenerative changes of proximal midfoot are noted.    TENDONS:   -Inflammation and edema are seen in the extensor halluces longus tendon at the level of the first interphalangeal joint (image 10, series 10& 6)     LIGAMENTS:   -Lisfranc ligament: Intact. No subluxation.    MUSCLES AND SOFT TISSUES:   -Soft tissue ulceration of the plantar aspect of the great toe is noted, with soft tissue edema and enhancement surrounding the great toe. There is a nonenhancing fistulous connection on the plantar aspect of the great toe which extends to the underlying   asymmetry of the first distal phalanx (image 8, series 10). This communicates with a fluid collection in the dorsal and medial aspect of the great toe measuring 10 x 13 x 15 mm (image 7, series 10; image 5, series 11).    Soft tissue edema and enhancement is is present in the remainder of the first toe with less pronounced edema and enhancement seen in the proximal forefoot, most pronounced along the dorsum.     Fatty atrophy of the intrinsic muscles of the foot is noted.      Impression    IMPRESSION:  1.  Soft tissue ulceration along the plantar aspect of the great toe underlying the first interphalangeal joint which appears to communicate with a fluid collection which extends dorsally and medially to the first interphalangeal joint and proximal   phalanx measuring roughly 10 x 13 x 15 mm, with mild edema and enhancement of the first proximal and distal phalanxes also present. Persistent with osteomyelitis of the first distal and proximal phalanx centered at the first interphalangeal joint with a   developing  abscess/phlegmon which communicates with the soft tissue ulcer underlying the first interphalangeal joint.  2.  Inflammation and edema of the extensor pollicis longus tendon at the level of the first interphalangeal joint, favored reflect infectious tenosynovitis, no evidence of shraddha disruption of the tendon is seen at this time.       Medications     sodium chloride 100 mL/hr at 12/22/22 2100       ceFEPIme  2 g Intravenous Q12H     insulin aspart  1-7 Units Subcutaneous TID AC     insulin aspart  1-5 Units Subcutaneous At Bedtime     metroNIDAZOLE  500 mg Intravenous Q12H     sodium chloride (PF)  3 mL Intracatheter Q8H     vancomycin  1,250 mg Intravenous Q12H

## 2022-12-24 ENCOUNTER — ANESTHESIA EVENT (OUTPATIENT)
Dept: SURGERY | Facility: CLINIC | Age: 57
DRG: 616 | End: 2022-12-24
Payer: COMMERCIAL

## 2022-12-24 ENCOUNTER — APPOINTMENT (OUTPATIENT)
Dept: GENERAL RADIOLOGY | Facility: CLINIC | Age: 57
DRG: 616 | End: 2022-12-24
Attending: PODIATRIST
Payer: COMMERCIAL

## 2022-12-24 ENCOUNTER — ANESTHESIA (OUTPATIENT)
Dept: SURGERY | Facility: CLINIC | Age: 57
DRG: 616 | End: 2022-12-24
Payer: COMMERCIAL

## 2022-12-24 LAB
BACTERIA WND CULT: ABNORMAL
CREAT SERPL-MCNC: 0.52 MG/DL (ref 0.52–1.04)
GFR SERPL CREATININE-BSD FRML MDRD: >90 ML/MIN/1.73M2
GLUCOSE BLDC GLUCOMTR-MCNC: 128 MG/DL (ref 70–99)
GLUCOSE BLDC GLUCOMTR-MCNC: 134 MG/DL (ref 70–99)
GLUCOSE BLDC GLUCOMTR-MCNC: 140 MG/DL (ref 70–99)
GLUCOSE BLDC GLUCOMTR-MCNC: 147 MG/DL (ref 70–99)
GLUCOSE BLDC GLUCOMTR-MCNC: 160 MG/DL (ref 70–99)
GRAM STAIN RESULT: ABNORMAL
GRAM STAIN RESULT: ABNORMAL
MAGNESIUM SERPL-MCNC: 1.8 MG/DL (ref 1.6–2.3)
POTASSIUM BLD-SCNC: 3.6 MMOL/L (ref 3.4–5.3)
VANCOMYCIN SERPL-MCNC: 15.7 MG/L

## 2022-12-24 PROCEDURE — 250N000011 HC RX IP 250 OP 636: Performed by: HOSPITALIST

## 2022-12-24 PROCEDURE — 99232 SBSQ HOSP IP/OBS MODERATE 35: CPT | Performed by: STUDENT IN AN ORGANIZED HEALTH CARE EDUCATION/TRAINING PROGRAM

## 2022-12-24 PROCEDURE — 87077 CULTURE AEROBIC IDENTIFY: CPT | Performed by: PODIATRIST

## 2022-12-24 PROCEDURE — 250N000011 HC RX IP 250 OP 636: Performed by: PODIATRIST

## 2022-12-24 PROCEDURE — 360N000082 HC SURGERY LEVEL 2 W/ FLUORO, PER MIN: Performed by: PODIATRIST

## 2022-12-24 PROCEDURE — 88311 DECALCIFY TISSUE: CPT | Mod: 26 | Performed by: STUDENT IN AN ORGANIZED HEALTH CARE EDUCATION/TRAINING PROGRAM

## 2022-12-24 PROCEDURE — 250N000009 HC RX 250

## 2022-12-24 PROCEDURE — 0Y6Q0Z0 DETACHMENT AT LEFT 1ST TOE, COMPLETE, OPEN APPROACH: ICD-10-PCS | Performed by: PODIATRIST

## 2022-12-24 PROCEDURE — 87075 CULTR BACTERIA EXCEPT BLOOD: CPT | Performed by: PODIATRIST

## 2022-12-24 PROCEDURE — 250N000013 HC RX MED GY IP 250 OP 250 PS 637: Performed by: PODIATRIST

## 2022-12-24 PROCEDURE — 250N000013 HC RX MED GY IP 250 OP 250 PS 637: Performed by: STUDENT IN AN ORGANIZED HEALTH CARE EDUCATION/TRAINING PROGRAM

## 2022-12-24 PROCEDURE — 250N000011 HC RX IP 250 OP 636

## 2022-12-24 PROCEDURE — 370N000017 HC ANESTHESIA TECHNICAL FEE, PER MIN: Performed by: PODIATRIST

## 2022-12-24 PROCEDURE — 36415 COLL VENOUS BLD VENIPUNCTURE: CPT | Performed by: HOSPITALIST

## 2022-12-24 PROCEDURE — L4361 PNEUMA/VAC WALK BOOT PRE OTS: HCPCS

## 2022-12-24 PROCEDURE — 120N000001 HC R&B MED SURG/OB

## 2022-12-24 PROCEDURE — 258N000003 HC RX IP 258 OP 636: Performed by: HOSPITALIST

## 2022-12-24 PROCEDURE — 710N000009 HC RECOVERY PHASE 1, LEVEL 1, PER MIN: Performed by: PODIATRIST

## 2022-12-24 PROCEDURE — 28820 AMPUTATION OF TOE: CPT | Mod: TA | Performed by: PODIATRIST

## 2022-12-24 PROCEDURE — 80202 ASSAY OF VANCOMYCIN: CPT | Performed by: HOSPITALIST

## 2022-12-24 PROCEDURE — 258N000001 HC RX 258: Performed by: PODIATRIST

## 2022-12-24 PROCEDURE — 88311 DECALCIFY TISSUE: CPT | Mod: TC | Performed by: PODIATRIST

## 2022-12-24 PROCEDURE — 84132 ASSAY OF SERUM POTASSIUM: CPT | Performed by: HOSPITALIST

## 2022-12-24 PROCEDURE — 88305 TISSUE EXAM BY PATHOLOGIST: CPT | Mod: 26 | Performed by: STUDENT IN AN ORGANIZED HEALTH CARE EDUCATION/TRAINING PROGRAM

## 2022-12-24 PROCEDURE — 250N000009 HC RX 250: Performed by: PODIATRIST

## 2022-12-24 PROCEDURE — 272N000001 HC OR GENERAL SUPPLY STERILE: Performed by: PODIATRIST

## 2022-12-24 PROCEDURE — 999N000141 HC STATISTIC PRE-PROCEDURE NURSING ASSESSMENT: Performed by: PODIATRIST

## 2022-12-24 PROCEDURE — 999N000065 XR FOOT PORT LEFT 3 VIEWS: Mod: LT

## 2022-12-24 PROCEDURE — 82565 ASSAY OF CREATININE: CPT | Performed by: HOSPITALIST

## 2022-12-24 PROCEDURE — 83735 ASSAY OF MAGNESIUM: CPT | Performed by: HOSPITALIST

## 2022-12-24 PROCEDURE — 88305 TISSUE EXAM BY PATHOLOGIST: CPT | Mod: TC | Performed by: PODIATRIST

## 2022-12-24 RX ORDER — AMLODIPINE BESYLATE 10 MG/1
10 TABLET ORAL DAILY
Status: DISCONTINUED | OUTPATIENT
Start: 2022-12-24 | End: 2022-12-26 | Stop reason: HOSPADM

## 2022-12-24 RX ORDER — AMOXICILLIN 250 MG
1 CAPSULE ORAL 2 TIMES DAILY
Status: DISCONTINUED | OUTPATIENT
Start: 2022-12-24 | End: 2022-12-26 | Stop reason: HOSPADM

## 2022-12-24 RX ORDER — ONDANSETRON 2 MG/ML
INJECTION INTRAMUSCULAR; INTRAVENOUS PRN
Status: DISCONTINUED | OUTPATIENT
Start: 2022-12-24 | End: 2022-12-24

## 2022-12-24 RX ORDER — LOSARTAN POTASSIUM 25 MG/1
25 TABLET ORAL DAILY
Status: DISCONTINUED | OUTPATIENT
Start: 2022-12-24 | End: 2022-12-26 | Stop reason: HOSPADM

## 2022-12-24 RX ORDER — BUPIVACAINE HYDROCHLORIDE 5 MG/ML
INJECTION, SOLUTION EPIDURAL; INTRACAUDAL PRN
Status: DISCONTINUED | OUTPATIENT
Start: 2022-12-24 | End: 2022-12-24 | Stop reason: HOSPADM

## 2022-12-24 RX ORDER — ONDANSETRON 4 MG/1
4 TABLET, ORALLY DISINTEGRATING ORAL EVERY 6 HOURS PRN
Status: DISCONTINUED | OUTPATIENT
Start: 2022-12-24 | End: 2022-12-26 | Stop reason: HOSPADM

## 2022-12-24 RX ORDER — LIDOCAINE HYDROCHLORIDE 20 MG/ML
INJECTION, SOLUTION INFILTRATION; PERINEURAL PRN
Status: DISCONTINUED | OUTPATIENT
Start: 2022-12-24 | End: 2022-12-24

## 2022-12-24 RX ORDER — OXYCODONE HYDROCHLORIDE 5 MG/1
5 TABLET ORAL EVERY 4 HOURS PRN
Status: DISCONTINUED | OUTPATIENT
Start: 2022-12-24 | End: 2022-12-26 | Stop reason: HOSPADM

## 2022-12-24 RX ORDER — ACETAMINOPHEN 325 MG/1
650 TABLET ORAL EVERY 4 HOURS PRN
Status: DISCONTINUED | OUTPATIENT
Start: 2022-12-27 | End: 2022-12-26 | Stop reason: HOSPADM

## 2022-12-24 RX ORDER — PROCHLORPERAZINE MALEATE 10 MG
10 TABLET ORAL EVERY 6 HOURS PRN
Status: DISCONTINUED | OUTPATIENT
Start: 2022-12-24 | End: 2022-12-26 | Stop reason: HOSPADM

## 2022-12-24 RX ORDER — NALOXONE HYDROCHLORIDE 0.4 MG/ML
0.2 INJECTION, SOLUTION INTRAMUSCULAR; INTRAVENOUS; SUBCUTANEOUS
Status: DISCONTINUED | OUTPATIENT
Start: 2022-12-24 | End: 2022-12-26 | Stop reason: HOSPADM

## 2022-12-24 RX ORDER — HYDROMORPHONE HCL IN WATER/PF 6 MG/30 ML
0.4 PATIENT CONTROLLED ANALGESIA SYRINGE INTRAVENOUS EVERY 5 MIN PRN
Status: DISCONTINUED | OUTPATIENT
Start: 2022-12-24 | End: 2022-12-24 | Stop reason: HOSPADM

## 2022-12-24 RX ORDER — HYDROMORPHONE HCL IN WATER/PF 6 MG/30 ML
0.2 PATIENT CONTROLLED ANALGESIA SYRINGE INTRAVENOUS EVERY 5 MIN PRN
Status: DISCONTINUED | OUTPATIENT
Start: 2022-12-24 | End: 2022-12-24 | Stop reason: HOSPADM

## 2022-12-24 RX ORDER — FENTANYL CITRATE 50 UG/ML
25 INJECTION, SOLUTION INTRAMUSCULAR; INTRAVENOUS EVERY 5 MIN PRN
Status: DISCONTINUED | OUTPATIENT
Start: 2022-12-24 | End: 2022-12-24 | Stop reason: HOSPADM

## 2022-12-24 RX ORDER — NALOXONE HYDROCHLORIDE 0.4 MG/ML
0.4 INJECTION, SOLUTION INTRAMUSCULAR; INTRAVENOUS; SUBCUTANEOUS
Status: DISCONTINUED | OUTPATIENT
Start: 2022-12-24 | End: 2022-12-26 | Stop reason: HOSPADM

## 2022-12-24 RX ORDER — ONDANSETRON 2 MG/ML
4 INJECTION INTRAMUSCULAR; INTRAVENOUS EVERY 6 HOURS PRN
Status: DISCONTINUED | OUTPATIENT
Start: 2022-12-24 | End: 2022-12-26 | Stop reason: HOSPADM

## 2022-12-24 RX ORDER — SODIUM CHLORIDE, SODIUM LACTATE, POTASSIUM CHLORIDE, CALCIUM CHLORIDE 600; 310; 30; 20 MG/100ML; MG/100ML; MG/100ML; MG/100ML
INJECTION, SOLUTION INTRAVENOUS CONTINUOUS
Status: DISCONTINUED | OUTPATIENT
Start: 2022-12-24 | End: 2022-12-24 | Stop reason: HOSPADM

## 2022-12-24 RX ORDER — ONDANSETRON 2 MG/ML
4 INJECTION INTRAMUSCULAR; INTRAVENOUS EVERY 30 MIN PRN
Status: DISCONTINUED | OUTPATIENT
Start: 2022-12-24 | End: 2022-12-24 | Stop reason: HOSPADM

## 2022-12-24 RX ORDER — ONDANSETRON 4 MG/1
4 TABLET, ORALLY DISINTEGRATING ORAL EVERY 30 MIN PRN
Status: DISCONTINUED | OUTPATIENT
Start: 2022-12-24 | End: 2022-12-24 | Stop reason: HOSPADM

## 2022-12-24 RX ORDER — OXYCODONE HYDROCHLORIDE 5 MG/1
10 TABLET ORAL EVERY 4 HOURS PRN
Status: DISCONTINUED | OUTPATIENT
Start: 2022-12-24 | End: 2022-12-26 | Stop reason: HOSPADM

## 2022-12-24 RX ORDER — PROPOFOL 10 MG/ML
INJECTION, EMULSION INTRAVENOUS CONTINUOUS PRN
Status: DISCONTINUED | OUTPATIENT
Start: 2022-12-24 | End: 2022-12-24

## 2022-12-24 RX ORDER — POLYETHYLENE GLYCOL 3350 17 G/17G
17 POWDER, FOR SOLUTION ORAL DAILY
Status: DISCONTINUED | OUTPATIENT
Start: 2022-12-25 | End: 2022-12-26 | Stop reason: HOSPADM

## 2022-12-24 RX ORDER — LABETALOL HYDROCHLORIDE 5 MG/ML
10 INJECTION, SOLUTION INTRAVENOUS
Status: DISCONTINUED | OUTPATIENT
Start: 2022-12-24 | End: 2022-12-24 | Stop reason: HOSPADM

## 2022-12-24 RX ORDER — BISACODYL 10 MG
10 SUPPOSITORY, RECTAL RECTAL DAILY PRN
Status: DISCONTINUED | OUTPATIENT
Start: 2022-12-24 | End: 2022-12-26 | Stop reason: HOSPADM

## 2022-12-24 RX ORDER — LIDOCAINE 40 MG/G
CREAM TOPICAL
Status: DISCONTINUED | OUTPATIENT
Start: 2022-12-24 | End: 2022-12-26 | Stop reason: HOSPADM

## 2022-12-24 RX ORDER — ACETAMINOPHEN 325 MG/1
975 TABLET ORAL EVERY 8 HOURS
Status: DISCONTINUED | OUTPATIENT
Start: 2022-12-24 | End: 2022-12-26 | Stop reason: HOSPADM

## 2022-12-24 RX ORDER — FENTANYL CITRATE 50 UG/ML
50 INJECTION, SOLUTION INTRAMUSCULAR; INTRAVENOUS EVERY 5 MIN PRN
Status: DISCONTINUED | OUTPATIENT
Start: 2022-12-24 | End: 2022-12-24 | Stop reason: HOSPADM

## 2022-12-24 RX ADMIN — ONDANSETRON 4 MG: 2 INJECTION INTRAMUSCULAR; INTRAVENOUS at 08:58

## 2022-12-24 RX ADMIN — LOSARTAN POTASSIUM 25 MG: 25 TABLET, FILM COATED ORAL at 15:52

## 2022-12-24 RX ADMIN — METRONIDAZOLE 500 MG: 500 INJECTION, SOLUTION INTRAVENOUS at 23:16

## 2022-12-24 RX ADMIN — LIDOCAINE HYDROCHLORIDE 50 MG: 20 INJECTION, SOLUTION INFILTRATION; PERINEURAL at 08:58

## 2022-12-24 RX ADMIN — PROPOFOL 100 MCG/KG/MIN: 10 INJECTION, EMULSION INTRAVENOUS at 09:00

## 2022-12-24 RX ADMIN — ACETAMINOPHEN 975 MG: 325 TABLET, FILM COATED ORAL at 13:32

## 2022-12-24 RX ADMIN — CEFEPIME HYDROCHLORIDE 2 G: 2 INJECTION, POWDER, FOR SOLUTION INTRAVENOUS at 10:24

## 2022-12-24 RX ADMIN — AMLODIPINE BESYLATE 10 MG: 10 TABLET ORAL at 15:52

## 2022-12-24 RX ADMIN — VANCOMYCIN HYDROCHLORIDE 1250 MG: 10 INJECTION, POWDER, LYOPHILIZED, FOR SOLUTION INTRAVENOUS at 07:20

## 2022-12-24 RX ADMIN — CEFEPIME HYDROCHLORIDE 2 G: 2 INJECTION, POWDER, FOR SOLUTION INTRAVENOUS at 22:28

## 2022-12-24 RX ADMIN — ACETAMINOPHEN 975 MG: 325 TABLET, FILM COATED ORAL at 22:24

## 2022-12-24 RX ADMIN — SODIUM CHLORIDE: 9 INJECTION, SOLUTION INTRAVENOUS at 06:08

## 2022-12-24 RX ADMIN — MIDAZOLAM 2 MG: 1 INJECTION INTRAMUSCULAR; INTRAVENOUS at 08:55

## 2022-12-24 ASSESSMENT — ACTIVITIES OF DAILY LIVING (ADL)
ADLS_ACUITY_SCORE: 35
ADLS_ACUITY_SCORE: 38
ADLS_ACUITY_SCORE: 35
ADLS_ACUITY_SCORE: 38
ADLS_ACUITY_SCORE: 35
ADLS_ACUITY_SCORE: 38
ADLS_ACUITY_SCORE: 35
ADLS_ACUITY_SCORE: 35

## 2022-12-24 ASSESSMENT — LIFESTYLE VARIABLES: TOBACCO_USE: 1

## 2022-12-24 NOTE — PROGRESS NOTES
S.  Patient was seen today at  for an evaluation for a cam walker for their left foot/ankle as ordered by Dr. Lawrence  O/G. help stabilize foot and ankle.  A.  Patient was fit with a medium short aircast boot pneumatic Cam Walker for the Left leg.  Cam walker was assembled by removing the soft liner from the foot plate and uprights, the patients foot and leg was positioned into the soft liner with the heel firmly sealed.  The liner was closed tightly and secured with the Velcro closure. The heel and foot were positioned in the rocker bottom foot plate and the uprights were contoured to fall at mid-line of the lower leg and secured to the Velcro.  The foot plate Velcro straps were secured, proximal straps first, then the distal strap.  The lower leg straps were attached starting distal and working proximal. . Donning and doffing of the Cam Walker was explained.  P.   Patient was advised to contact this office with any problems or questions.  SILVESTRE Dior.

## 2022-12-24 NOTE — PLAN OF CARE
Goal Outcome Evaluation:         A/O x4. VSS on RA. Up IND. Mod carb diet. L great toe drained and dressed by podiatry today. Baseline neuropathy in BLE. Surgery for amputation tomorrow. Discharge pending.

## 2022-12-24 NOTE — PROGRESS NOTES
[facility]    Medicine Progress Note - Hospitalist Service    Date of Admission:  12/22/2022  Date of Service: 12/24/2022    Assessment & Plan      Guerita Gonzalez is a 57 year old female admitted on 12/22/2022    Left great toe diabetic foot infection with concerns for early sepsis  Lactic acidosis  Presents with progressive edema, redness, and ulceration of her left big toe  X-ray reveals no evidence of osteomyelitis  There is also concerns for sepsis with a heart rate of 120s with a lactic acid of 3.2  Pulses are palpable dorsalis pedis  CRP under 44  Plan  - Continue cefepime + Flagyl for anaerobic coverage  -- Can stop Vancomycin  - MRI of the left foot -> Soft tissue ulceration along the plantar aspect of the great toe underlying the first interphalangeal joint which appears to communicate with a fluid collection which extends dorsally and medially to the first interphalangeal joint and proximal  phalanx measuring roughly 10 x 13 x 15 mm, with mild edema and enhancement of the first proximal and distal phalanxes also present. Persistent with osteomyelitis.  - Podiatry following -> s/p left great toe amputation 12/24  -- Wound cares   -- Pain control PRN  -- Follow vitals / temp    Diabetes type 2 with neuropathy  Plan  - sliding scale insulin as needed with hypoglycemia protocol  - last a1c was only 6.4 in August  - repeat hemoglobin A1c this admission 6.4    Hypokalemia, hypophosphatemia, hypomagnesemia  Plan  - on replacement protocols       Diet: Advance Diet as Tolerated: Fully Advanced to diet(s) per Provider order; Moderate Consistent Carb (60 g CHO per Meal) Diet    DVT Prophylaxis: Pneumatic Compression Devices  Brown Catheter: Not present  Central Lines: None  Cardiac Monitoring: None  Code Status: Full Code      Disposition Plan      Expected Discharge Date: 12/26/2022, 12:00 PM      Discharge Comments: Podiatry consult pending        The patient's care was discussed with the  "Bedside Nurse and Patient.    Andry Garza MD  Hospitalist Service  [facility]  Securely message with the [email or web address] Web Console (learn more here)  Text page via [employer] Paging/Directory         Clinically Significant Risk Factors        # Hypokalemia: Lowest K = 2.7 mmol/L in last 2 days, will replace as needed  # Hyponatremia: Lowest Na = 135 mmol/L in last 2 days, will monitor as appropriate   # Hypercalcemia: corrected calcium is >10.1, will monitor as appropriate  # Hypomagnesemia: Lowest Mg = 1.1 mg/dL in last 2 days, will replace as needed   # Hypoalbuminemia: Lowest albumin = 2.7 g/dL at 12/23/2022  5:51 AM, will monitor as appropriate            # Obesity: Estimated body mass index is 35.78 kg/m  as calculated from the following:    Height as of this encounter: 1.651 m (5' 5\").    Weight as of this encounter: 97.5 kg (215 lb)., PRESENT ON ADMISSION         ______________________________________________________________________    Interval History     Doing well post op  Pain controlled  No CP/SOB  No nausea / vomiting  No fevers or chills  No new complaints    Data reviewed today: I reviewed all medications, new labs and imaging results over the last 24 hours. I personally reviewed no images or EKG's today.    Physical Exam   Vital Signs: Temp: 98.4  F (36.9  C) Temp src: Oral BP: (!) 143/87 Pulse: 71   Resp: 18 SpO2: 96 % O2 Device: None (Room air) Oxygen Delivery: 1 LPM  Weight: 215 lbs 0 oz     Constitutional: awake, alert, cooperative, no apparent distress.   Respiratory: CTABL   Cardiovascular: RRR with no m/r/g   GI: Normal bowel sounds, soft, non-distended, non-tender.  Skin: normal skin color, texture, turgor   Musculoskeletal: There is no redness, warmth, or swelling of the joints. Right foot wrapped.   Neurologic: Awake, alert, oriented to name, place and time. Motor is 5 out of 5 bilaterally. Sensory is intact.   Neuropsychiatric: normal mood and " affect    ----------------------------------------------------------------------------------------    Data   Recent Labs   Lab 12/24/22  1001 12/24/22  0658 12/24/22  0207 12/23/22  2200 12/23/22  1722 12/23/22  0930 12/23/22  0551 12/22/22  2133 12/22/22  1557 12/22/22  1556   WBC  --   --   --   --   --   --  7.3  --   --  9.8   HGB  --   --   --   --   --   --  10.5*  --   --  12.6   MCV  --   --   --   --   --   --  89  --   --  91   PLT  --   --   --   --   --   --  253  --   --  291   INR  --   --   --   --   --   --   --   --  1.19*  --    NA  --   --   --   --   --   --  138  --   --  135   POTASSIUM  --  3.6  --   --   --  3.6 3.5   < >  --  2.7*   CHLORIDE  --   --   --   --   --   --  107  --   --  101   CO2  --   --   --   --   --   --  20  --   --  22   BUN  --   --   --   --   --   --  4*  --   --  7   CR  --  0.52  --   --   --   --  0.51*  --   --  0.60   ANIONGAP  --   --   --   --   --   --  11  --   --  12   GREGORY  --   --   --   --   --   --  8.5  --   --  9.2   *  --  140* 190*   < >  --  153*   < >  --  134*   ALBUMIN  --   --   --   --   --   --  2.7*  --   --   --    PROTTOTAL  --   --   --   --   --   --  6.9  --   --   --    BILITOTAL  --   --   --   --   --   --  1.0  --   --   --    ALKPHOS  --   --   --   --   --   --  66  --   --   --    ALT  --   --   --   --   --   --  12  --   --   --    AST  --   --   --   --   --   --  14  --   --   --     < > = values in this interval not displayed.     Recent Results (from the past 24 hour(s))   X-ray lt Foot 3 vw port: In PACU    Narrative    EXAM: XR FOOT PORT LEFT 3 VIEWS  LOCATION: [facility]  DATE/TIME: 12/24/2022 2:04 PM    INDICATION: post op  COMPARISON: 12/22/2022      Impression    IMPRESSION: Interval amputation of the great toe. Soft tissue swelling. No new bony abnormality.     Medications     sodium chloride 100 mL/hr at 12/24/22 1120       acetaminophen  975 mg Oral Q8H     ceFEPIme  2 g Intravenous  Q12H     insulin aspart  1-7 Units Subcutaneous TID AC     insulin aspart  1-5 Units Subcutaneous At Bedtime     metroNIDAZOLE  500 mg Intravenous Q12H     [START ON 12/25/2022] polyethylene glycol  17 g Oral Daily     senna-docusate  1 tablet Oral BID     sodium chloride (PF)  3 mL Intracatheter Q8H     vancomycin  1,250 mg Intravenous Q12H

## 2022-12-24 NOTE — OR NURSING
WOODROW Viera gave OK for pt to return to her inpt room 2412 from PACU. ABCDs intact. Pt tolerating ice chips.

## 2022-12-24 NOTE — PHARMACY-VANCOMYCIN DOSING SERVICE
Pharmacy Vancomycin Note  Date of Service 2022  Patient's  1965   57 year old, female    Indication: Abscess and Skin and Soft Tissue Infection  Day of Therapy: 2  Current vancomycin regimen:  1250 mg IV q12h  Current vancomycin monitoring method: AUC  Current vancomycin therapeutic monitoring goal: 400-600 mg*h/L    [employer] Prediction of Current Vancomycin Regimen  Regimen: 1250 mg IV every 12 hours.  Start time: 12:23 on 2022  Exposure target: AUC24 (range)400-600 mg/L.hr   AUC24,ss: 515 mg/L.hr  Probability of AUC24 > 400: 92 %  Ctrough,ss: 16.2 mg/L  Probability of Ctrough,ss > 20: 19 %  Probability of nephrotoxicity (Lodise ALAINA ): 12 %    Current estimated CrCl = Estimated Creatinine Clearance: 141.3 mL/min (based on SCr of 0.52 mg/dL).    Creatinine for last 3 days  2022:  3:56 PM Creatinine 0.60 mg/dL  2022:  5:51 AM Creatinine 0.51 mg/dL  2022:  6:58 AM Creatinine 0.52 mg/dL    Recent Vancomycin Levels (past 3 days)  2022:  6:58 AM Vancomycin 15.7 mg/L    Vancomycin IV Administrations (past 72 hours)                   vancomycin (VANCOCIN) 1,250 mg in 0.9% NaCl 250 mL intermittent infusion (mg) 1,250 mg New Bag 22 0720     1,250 mg New Bag 22 1908     1,250 mg New Bag  0636    vancomycin (VANCOCIN) 2,500 mg in 0.9% NaCl 500 mL intermittent infusion (mg) 2,500 mg New Bag 22 1654                Nephrotoxins and other renal medications (From now, onward)    Start     Dose/Rate Route Frequency Ordered Stop    22 0600  vancomycin (VANCOCIN) 1,250 mg in 0.9% NaCl 250 mL intermittent infusion         1,250 mg  over 90 Minutes Intravenous EVERY 12 HOURS 22 2131               Contrast Orders - past 72 hours (72h ago, onward)    Start     Dose/Rate Route Frequency Stop    22 0000  gadobutrol (GADAVIST) injection 10 mL         10 mL Intravenous ONCE 22 0054          Interpretation of levels and current  regimen:  Vancomycin level is reflective of -600    Has serum creatinine changed greater than 50% in last 72 hours: No    Urine output:  unable to determine    Renal Function: Stable    Plan:  1. Continue Current Dose  2. Vancomycin monitoring method: AUC  3. Vancomycin therapeutic monitoring goal: 400-600 mg*h/L  4. Pharmacy will check vancomycin levels as appropriate in 1-3 Days.  5. Serum creatinine levels will be ordered daily for the first week of therapy and at least twice weekly for subsequent weeks.    Quita Mancini, TrinityD

## 2022-12-24 NOTE — ANESTHESIA POSTPROCEDURE EVALUATION
Patient: Guerita Gonzalez    Procedure: Procedure(s):  left great toe amputation       Anesthesia Type:  MAC    Note:  Disposition: Inpatient   Postop Pain Control: Uneventful            Sign Out: Well controlled pain   PONV: No   Neuro/Psych: Uneventful            Sign Out: Acceptable/Baseline neuro status   Airway/Respiratory: Uneventful            Sign Out: Acceptable/Baseline resp. status   CV/Hemodynamics: Uneventful            Sign Out: Acceptable CV status; No obvious hypovolemia; No obvious fluid overload   Other NRE: NONE   DID A NON-ROUTINE EVENT OCCUR? No           Last vitals:  Vitals Value Taken Time   /87 12/24/22 1020   Temp 36.5  C (97.7  F) 12/24/22 1010   Pulse 70 12/24/22 1027   Resp 19 12/24/22 1027   SpO2 95 % 12/24/22 1027   Vitals shown include unvalidated device data.    Electronically Signed By: KODAK PATTEN MD  December 24, 2022  10:28 AM

## 2022-12-24 NOTE — PROGRESS NOTES
Patient A/O X4, calm but has a flat affect. Aware of possible procedure today and has been NPO since midnight. Left foot dressing covered with ace wraps, no drainage. Patient reports baseline neuropathy in BLE, unchanged. Up independently in room, denies pain this shift. No new concerns at this time, will keep monitoring.

## 2022-12-24 NOTE — BRIEF OP NOTE
[facility]    Brief Operative Note    Pre-operative diagnosis: Diabetic polyneuropathy associated with type 2 diabetes mellitus (H) [E11.42]  Ulcer of left foot, with fat layer exposed (H) [L97.522]  Osteomyelitis of great toe of left foot (H) [M86.9]  Post-operative diagnosis Same as pre-operative diagnosis    Procedure: Procedure(s):  left great toe amputation  Surgeon: Surgeon(s) and Role:     * Belén Lawrence DPM, Podiatry/Foot and Ankle Surgery - Primary  Anesthesia: MAC with Local   Estimated Blood Loss: Less than 50 ml    Drains: None  Specimens:   ID Type Source Tests Collected by Time Destination   1 : Left Great Toe Tissue Toe, Left SURGICAL PATHOLOGY EXAM Belén Lawrence DPM, Podiatry/Foot and Ankle Surgery 12/24/2022  9:20 AM    A : Left Great Toe Tissue Toe, Left ANAEROBIC BACTERIAL CULTURE ROUTINE, AEROBIC BACTERIAL CULTURE ROUTINE Belén Lawrence DPM, Podiatry/Foot and Ankle Surgery 12/24/2022  9:20 AM      Findings:   None.  Complications: None.  Implants: * No implants in log *

## 2022-12-24 NOTE — ANESTHESIA CARE TRANSFER NOTE
Patient: Guerita Gonzalez    Procedure: Procedure(s):  left great toe amputation       Diagnosis: Diabetic polyneuropathy associated with type 2 diabetes mellitus (H) [E11.42]  Ulcer of left foot, with fat layer exposed (H) [L97.522]  Osteomyelitis of great toe of left foot (H) [M86.9]  Diagnosis Additional Information: No value filed.    Anesthesia Type:   MAC     Note:    Oropharynx: oropharynx clear of all foreign objects  Level of Consciousness: awake  Oxygen Supplementation: face mask    Independent Airway: airway patency satisfactory and stable  Dentition: dentition unchanged  Vital Signs Stable: post-procedure vital signs reviewed and stable  Report to RN Given: handoff report given  Patient transferred to: PACU    Handoff Report: Identifed the Patient, Identified the Reponsible Provider, Reviewed the pertinent medical history, Discussed the surgical course, Reviewed Intra-OP anesthesia mangement and issues during anesthesia, Set expectations for post-procedure period and Allowed opportunity for questions and acknowledgement of understanding      Vitals:  Vitals Value Taken Time   /79    Temp     Pulse 82    Resp 12    SpO2 98        Electronically Signed By: CAROLINA Castrejon CRNA  December 24, 2022  9:42 AM

## 2022-12-24 NOTE — ANESTHESIA PREPROCEDURE EVALUATION
Anesthesia Pre-Procedure Evaluation    Patient: Guerita Gonzalez   MRN: [id number] : 1965        Procedure : Procedure(s):  left great toe amputation          Past Medical History:   Diagnosis Date     Benign essential hypertension      Diabetes (H)       Past Surgical History:   Procedure Laterality Date     APPENDECTOMY        Allergies   Allergen Reactions     Penicillins      hives      Social History     Tobacco Use     Smoking status: Some Days     Years: 15.00     Types: Cigarettes     Smokeless tobacco: Not on file   Substance Use Topics     Alcohol use: Yes      Wt Readings from Last 1 Encounters:   22 97.5 kg (215 lb)        Anesthesia Evaluation            ROS/MED HX  ENT/Pulmonary:     (+) ALECIA risk factors, hypertension, obese, tobacco use, Current use,  (-) sleep apnea   Neurologic:       Cardiovascular:     (+) Dyslipidemia hypertension-----    METS/Exercise Tolerance:     Hematologic:       Musculoskeletal:       GI/Hepatic:     (+) GERD,     Renal/Genitourinary:       Endo:     (+) type II DM, Obesity,     Psychiatric/Substance Use:       Infectious Disease:       Malignancy:       Other: Comment: Left great toe diabetic foot infection with concerns for early sepsis;  Lactic acidosis;           Physical Exam    Airway        Mallampati: III   TM distance: > 3 FB   Neck ROM: full   Mouth opening: > 3 cm    Respiratory Devices and Support         Dental  no notable dental history         Cardiovascular   cardiovascular exam normal          Pulmonary   pulmonary exam normal                OUTSIDE LABS:  CBC:   Lab Results   Component Value Date    WBC 7.3 2022    WBC 9.8 2022    HGB 10.5 (L) 2022    HGB 12.6 2022    HCT 30.9 (L) 2022    HCT 36.8 2022     2022     2022     BMP:   Lab Results   Component Value Date     2022     2022    POTASSIUM 3.6 2022    POTASSIUM 3.5 2022    CHLORIDE 107  12/23/2022    CHLORIDE 101 12/22/2022    CO2 20 12/23/2022    CO2 22 12/22/2022    BUN 4 (L) 12/23/2022    BUN 7 12/22/2022    CR 0.51 (L) 12/23/2022    CR 0.60 12/22/2022     (H) 12/24/2022     (H) 12/23/2022     COAGS:   Lab Results   Component Value Date    PTT 28 12/22/2022    INR 1.19 (H) 12/22/2022     POC:   Lab Results   Component Value Date    HCG Negative 11/10/2005     HEPATIC:   Lab Results   Component Value Date    ALBUMIN 2.7 (L) 12/23/2022    PROTTOTAL 6.9 12/23/2022    ALT 12 12/23/2022    AST 14 12/23/2022    ALKPHOS 66 12/23/2022    BILITOTAL 1.0 12/23/2022     OTHER:   Lab Results   Component Value Date    LACT 1.1 12/22/2022    A1C 6.4 (H) 12/22/2022    GREGORY 8.5 12/23/2022    PHOS 2.6 12/23/2022    MAG 2.3 12/23/2022    LIPASE 139 06/15/2021    .0 (H) 12/22/2022    SED 63 (H) 12/22/2022       Anesthesia Plan    ASA Status:  3   NPO Status:  NPO Appropriate    Anesthesia Type: MAC.              Consents    Anesthesia Plan(s) and associated risks, benefits, and realistic alternatives discussed. Questions answered and patient/representative(s) expressed understanding.    - Discussed:     - Discussed with:  Patient         Postoperative Care    Pain management: IV analgesics.   PONV prophylaxis: Ondansetron (or other 5HT-3)     Comments:                KODAK PATTEN MD

## 2022-12-25 ENCOUNTER — APPOINTMENT (OUTPATIENT)
Dept: PHYSICAL THERAPY | Facility: CLINIC | Age: 57
DRG: 616 | End: 2022-12-25
Payer: COMMERCIAL

## 2022-12-25 LAB
CREAT SERPL-MCNC: 0.53 MG/DL (ref 0.52–1.04)
GFR SERPL CREATININE-BSD FRML MDRD: >90 ML/MIN/1.73M2
GLUCOSE BLDC GLUCOMTR-MCNC: 116 MG/DL (ref 70–99)
GLUCOSE BLDC GLUCOMTR-MCNC: 118 MG/DL (ref 70–99)
GLUCOSE BLDC GLUCOMTR-MCNC: 122 MG/DL (ref 70–99)
GLUCOSE BLDC GLUCOMTR-MCNC: 142 MG/DL (ref 70–99)
GLUCOSE BLDC GLUCOMTR-MCNC: 174 MG/DL (ref 70–99)
GLUCOSE BLDC GLUCOMTR-MCNC: 191 MG/DL (ref 70–99)
MAGNESIUM SERPL-MCNC: 1.6 MG/DL (ref 1.6–2.3)
PHOSPHATE SERPL-MCNC: 2.5 MG/DL (ref 2.5–4.5)
POTASSIUM BLD-SCNC: 3.2 MMOL/L (ref 3.4–5.3)
POTASSIUM BLD-SCNC: 3.2 MMOL/L (ref 3.4–5.3)
POTASSIUM BLD-SCNC: 3.6 MMOL/L (ref 3.4–5.3)

## 2022-12-25 PROCEDURE — 36415 COLL VENOUS BLD VENIPUNCTURE: CPT | Performed by: PODIATRIST

## 2022-12-25 PROCEDURE — 250N000011 HC RX IP 250 OP 636: Performed by: PODIATRIST

## 2022-12-25 PROCEDURE — 82565 ASSAY OF CREATININE: CPT | Performed by: PODIATRIST

## 2022-12-25 PROCEDURE — 99232 SBSQ HOSP IP/OBS MODERATE 35: CPT | Performed by: STUDENT IN AN ORGANIZED HEALTH CARE EDUCATION/TRAINING PROGRAM

## 2022-12-25 PROCEDURE — 97116 GAIT TRAINING THERAPY: CPT | Mod: GP

## 2022-12-25 PROCEDURE — 97530 THERAPEUTIC ACTIVITIES: CPT | Mod: GP

## 2022-12-25 PROCEDURE — 97161 PT EVAL LOW COMPLEX 20 MIN: CPT | Mod: GP

## 2022-12-25 PROCEDURE — 84100 ASSAY OF PHOSPHORUS: CPT | Performed by: PODIATRIST

## 2022-12-25 PROCEDURE — 83735 ASSAY OF MAGNESIUM: CPT | Performed by: STUDENT IN AN ORGANIZED HEALTH CARE EDUCATION/TRAINING PROGRAM

## 2022-12-25 PROCEDURE — 36415 COLL VENOUS BLD VENIPUNCTURE: CPT | Performed by: STUDENT IN AN ORGANIZED HEALTH CARE EDUCATION/TRAINING PROGRAM

## 2022-12-25 PROCEDURE — 120N000001 HC R&B MED SURG/OB

## 2022-12-25 PROCEDURE — 84132 ASSAY OF SERUM POTASSIUM: CPT | Performed by: STUDENT IN AN ORGANIZED HEALTH CARE EDUCATION/TRAINING PROGRAM

## 2022-12-25 PROCEDURE — 250N000013 HC RX MED GY IP 250 OP 250 PS 637: Performed by: PODIATRIST

## 2022-12-25 PROCEDURE — 250N000013 HC RX MED GY IP 250 OP 250 PS 637: Performed by: STUDENT IN AN ORGANIZED HEALTH CARE EDUCATION/TRAINING PROGRAM

## 2022-12-25 RX ORDER — POTASSIUM CHLORIDE 1500 MG/1
40 TABLET, EXTENDED RELEASE ORAL ONCE
Status: COMPLETED | OUTPATIENT
Start: 2022-12-25 | End: 2022-12-25

## 2022-12-25 RX ADMIN — SENNOSIDES AND DOCUSATE SODIUM 1 TABLET: 50; 8.6 TABLET ORAL at 08:00

## 2022-12-25 RX ADMIN — CEFEPIME HYDROCHLORIDE 2 G: 2 INJECTION, POWDER, FOR SOLUTION INTRAVENOUS at 21:41

## 2022-12-25 RX ADMIN — ACETAMINOPHEN 975 MG: 325 TABLET, FILM COATED ORAL at 21:43

## 2022-12-25 RX ADMIN — METRONIDAZOLE 500 MG: 500 INJECTION, SOLUTION INTRAVENOUS at 12:55

## 2022-12-25 RX ADMIN — POTASSIUM CHLORIDE 40 MEQ: 1500 TABLET, EXTENDED RELEASE ORAL at 18:11

## 2022-12-25 RX ADMIN — INSULIN ASPART 2 UNITS: 100 INJECTION, SOLUTION INTRAVENOUS; SUBCUTANEOUS at 18:10

## 2022-12-25 RX ADMIN — CEFEPIME HYDROCHLORIDE 2 G: 2 INJECTION, POWDER, FOR SOLUTION INTRAVENOUS at 10:02

## 2022-12-25 RX ADMIN — AMLODIPINE BESYLATE 10 MG: 10 TABLET ORAL at 08:01

## 2022-12-25 RX ADMIN — ACETAMINOPHEN 975 MG: 325 TABLET, FILM COATED ORAL at 12:54

## 2022-12-25 RX ADMIN — POTASSIUM CHLORIDE 40 MEQ: 1500 TABLET, EXTENDED RELEASE ORAL at 10:02

## 2022-12-25 RX ADMIN — LOSARTAN POTASSIUM 25 MG: 25 TABLET, FILM COATED ORAL at 08:00

## 2022-12-25 RX ADMIN — INSULIN ASPART 1 UNITS: 100 INJECTION, SOLUTION INTRAVENOUS; SUBCUTANEOUS at 08:01

## 2022-12-25 RX ADMIN — ACETAMINOPHEN 975 MG: 325 TABLET, FILM COATED ORAL at 05:43

## 2022-12-25 ASSESSMENT — ACTIVITIES OF DAILY LIVING (ADL)
ADLS_ACUITY_SCORE: 21
WALKING_OR_CLIMBING_STAIRS_DIFFICULTY: NO
ADLS_ACUITY_SCORE: 38
ADLS_ACUITY_SCORE: 21
ADLS_ACUITY_SCORE: 21
DIFFICULTY_EATING/SWALLOWING: NO
ADLS_ACUITY_SCORE: 38
DRESSING/BATHING_DIFFICULTY: NO
ADLS_ACUITY_SCORE: 38
ADLS_ACUITY_SCORE: 21
CHANGE_IN_FUNCTIONAL_STATUS_SINCE_ONSET_OF_CURRENT_ILLNESS/INJURY: NO
ADLS_ACUITY_SCORE: 21
FALL_HISTORY_WITHIN_LAST_SIX_MONTHS: NO
CONCENTRATING,_REMEMBERING_OR_MAKING_DECISIONS_DIFFICULTY: NO
WEAR_GLASSES_OR_BLIND: NO
ADLS_ACUITY_SCORE: 21
TOILETING_ISSUES: NO
DOING_ERRANDS_INDEPENDENTLY_DIFFICULTY: NO
ADLS_ACUITY_SCORE: 38

## 2022-12-25 NOTE — PROGRESS NOTES
DATE & TIME: December 25th 2022 [id number]   Cognitive Concerns/ Orientation : AOx4  ABNL VS/O2: VSS on RA, runs hypertensive but has scheduled blood pressure medications   MOBILITY: Partial weight bearing, needs boot on all times when out of bed. A1 GBW  PAIN MANAGMENT: Scheduled tylenol   DIET: Mod Carb  BOWEL/BLADDER: Continent BB  ABNL LAB/BG: /116/191, potassium protocol 3.2 replaced redraw 3.2 replaced again redraw next at 2159.   DRAIN/DEVICES: PIV SL  SKIN: Left great toe incision. Dressing changed by podiatry today per orders   D/C DATE: Tomorrow

## 2022-12-25 NOTE — PROGRESS NOTES
[facility]    Medicine Progress Note - Hospitalist Service    Date of Admission:  12/22/2022  Date of Service: 12/25/2022    Assessment & Plan      Guerita Gonzalez is a 57 year old female admitted on 12/22/2022    Left great toe diabetic foot infection with concerns for early sepsis  Lactic acidosis  Presents with progressive edema, redness, and ulceration of her left big toe  X-ray reveals no evidence of osteomyelitis  There is also concerns for sepsis with a heart rate of 120s with a lactic acid of 3.2  Pulses are palpable dorsalis pedis  CRP under 44  Plan  - Continue cefepime + Flagyl for anaerobic coverage -> one more day of IV antibiotics then can discharge on oral clindamycin 3x a day for 14 days.   -- Can stop Vancomycin  - MRI of the left foot -> Soft tissue ulceration along the plantar aspect of the great toe underlying the first interphalangeal joint which appears to communicate with a fluid collection which extends dorsally and medially to the first interphalangeal joint and proximal  phalanx measuring roughly 10 x 13 x 15 mm, with mild edema and enhancement of the first proximal and distal phalanxes also present. Persistent with osteomyelitis.  - Podiatry following -> s/p left great toe amputation 12/24  -- Wound cares   -- Pain control PRN  -- Follow vitals / temp    Diabetes type 2 with neuropathy  Plan  - sliding scale insulin as needed with hypoglycemia protocol  - last a1c was only 6.4 in August  - repeat hemoglobin A1c this admission 6.4    Hypokalemia, hypophosphatemia, hypomagnesemia  Plan  - on replacement protocols       Diet: Advance Diet as Tolerated: Fully Advanced to diet(s) per Provider order; Moderate Consistent Carb (60 g CHO per Meal) Diet    DVT Prophylaxis: Pneumatic Compression Devices  Brown Catheter: Not present  Central Lines: None  Cardiac Monitoring: None  Code Status: Full Code      Disposition Plan      Expected Discharge Date: 12/26/2022       "  Discharge Comments: Podiatry consult pending        The patient's care was discussed with the Bedside Nurse and Patient.    Andry Garza MD  Hospitalist Service  [facility]  Securely message with the Vocera Web Console (learn more here)  Text page via [employer] Paging/Directory         Clinically Significant Risk Factors        # Hypokalemia: Lowest K = 3.2 mmol/L in last 2 days, will replace as needed     # Hypomagnesemia: Lowest Mg = 1.6 mg/dL in last 2 days, will replace as needed   # Hypoalbuminemia: Lowest albumin = 2.7 g/dL at 12/23/2022  5:51 AM, will monitor as appropriate            # Obesity: Estimated body mass index is 35.78 kg/m  as calculated from the following:    Height as of this encounter: 1.651 m (5' 5\").    Weight as of this encounter: 97.5 kg (215 lb)., PRESENT ON ADMISSION         ______________________________________________________________________    Interval History     Doing well post op  Pain controlled  No CP/SOB  No nausea / vomiting  No fevers or chills  No new complaints,     Data reviewed today: I reviewed all medications, new labs and imaging results over the last 24 hours. I personally reviewed no images or EKG's today.    Physical Exam   Vital Signs: Temp: 98.3  F (36.8  C) Temp src: Oral BP: (!) 146/88 Pulse: 74   Resp: 16 SpO2: 97 % O2 Device: None (Room air)    Weight: 215 lbs 0 oz     Constitutional: awake, alert, cooperative, no apparent distress.   Respiratory: CTABL   Cardiovascular: RRR with no m/r/g   GI: Normal bowel sounds, soft, non-distended, non-tender.  Skin: normal skin color, texture, turgor   Musculoskeletal: There is no redness, warmth, or swelling of the joints. Right foot wrapped.   Neurologic: Awake, alert, oriented to name, place and time. Motor is 5 out of 5 bilaterally. Sensory is intact.   Neuropsychiatric: normal mood and affect    ----------------------------------------------------------------------------------------    Data "   Recent Labs   Lab 12/25/22  1307 12/25/22  1139 12/25/22  0757 12/25/22  0622 12/24/22  1001 12/24/22  0658 12/23/22  1136 12/23/22  0930 12/23/22  0551 12/22/22  2133 12/22/22  1557 12/22/22  1556   WBC  --   --   --   --   --   --   --   --  7.3  --   --  9.8   HGB  --   --   --   --   --   --   --   --  10.5*  --   --  12.6   MCV  --   --   --   --   --   --   --   --  89  --   --  91   PLT  --   --   --   --   --   --   --   --  253  --   --  291   INR  --   --   --   --   --   --   --   --   --   --  1.19*  --    NA  --   --   --   --   --   --   --   --  138  --   --  135   POTASSIUM  --   --  3.2*  --   --  3.6  --  3.6 3.5   < >  --  2.7*   CHLORIDE  --   --   --   --   --   --   --   --  107  --   --  101   CO2  --   --   --   --   --   --   --   --  20  --   --  22   BUN  --   --   --   --   --   --   --   --  4*  --   --  7   CR  --   --  0.53  --   --  0.52  --   --  0.51*  --   --  0.60   ANIONGAP  --   --   --   --   --   --   --   --  11  --   --  12   GREGORY  --   --   --   --   --   --   --   --  8.5  --   --  9.2   * 116*  --  174*   < >  --    < >  --  153*   < >  --  134*   ALBUMIN  --   --   --   --   --   --   --   --  2.7*  --   --   --    PROTTOTAL  --   --   --   --   --   --   --   --  6.9  --   --   --    BILITOTAL  --   --   --   --   --   --   --   --  1.0  --   --   --    ALKPHOS  --   --   --   --   --   --   --   --  66  --   --   --    ALT  --   --   --   --   --   --   --   --  12  --   --   --    AST  --   --   --   --   --   --   --   --  14  --   --   --     < > = values in this interval not displayed.     No results found for this or any previous visit (from the past 24 hour(s)).  Medications     sodium chloride Stopped (12/24/22 1600)       acetaminophen  975 mg Oral Q8H     amLODIPine  10 mg Oral Daily     ceFEPIme  2 g Intravenous Q12H     insulin aspart  1-7 Units Subcutaneous TID AC     insulin aspart  1-5 Units Subcutaneous At Bedtime     losartan  25 mg Oral Daily      metroNIDAZOLE  500 mg Intravenous Q12H     polyethylene glycol  17 g Oral Daily     senna-docusate  1 tablet Oral BID     sodium chloride (PF)  3 mL Intracatheter Q8H

## 2022-12-25 NOTE — PROGRESS NOTES
"Podiatry / Foot and Ankle Surgery Progress Note    December 25, 2022    Subject: Patient was seen at bedside.  Notes no pain to left foot but has baseline neuropathy.    Objective:  Vitals: BP (!) 146/88 (BP Location: Right arm)   Pulse 74   Temp 98.3  F (36.8  C) (Oral)   Resp 16   Ht 1.651 m (5' 5\")   Wt 97.5 kg (215 lb)   LMP  (LMP Unknown)   SpO2 97%   BMI 35.78 kg/m    BMI= Body mass index is 35.78 kg/m .     WBC   Date Value Ref Range Status   06/15/2021 15.2 (H) 4.0 - 11.0 10e9/L Final     WBC Count   Date Value Ref Range Status   12/23/2022 7.3 4.0 - 11.0 10e3/uL Final       General:  Patient is alert and orientated.  NAD.    Vascular:  DP and PT pulses are palpable.  No varicosities noted  CFT's < 3secs.  Skin temp is normal.    Neuro:  Light and gross touch sensation diminished to feet.    Derm:  Dressing is c/d/i. Sutures intact. No redness, dehiscence or signs of acute infection.     Musculoskeletal: left great toe is amputated.     Imaging: left foot xray post op - I personally reviewed images - Interval amputation of the great toe. Soft tissue swelling. No new bony abnormality.    Cultures:    2+ Gram positive cocci      3+ WBC seen     Assessment: 57 yr old diabetic female s/p left great toe amputation due to osteomyelitis.     Plan:    -Dressing changed at bedside.   -Keep foot and dressing dry.  -minimal heel weight bearing on left foot in post op shoe. No more than 10 minutes an  Hour.  -all bone infection felt to be removed.   -No further surgery planned.   -recommend one more day of IV antibiotics then can discharge on oral clindamycin 3x a day for 14 days.   -Okay to discharge tomorrow from podiatry standpoint. recs placed.   -Podiatry will sign off. Please call with questions or concerns.       Belén Lawrence DPM, Podiatry/Foot and Ankle Surgery  10:27 AM    "

## 2022-12-25 NOTE — PLAN OF CARE
Physical Therapy Discharge Summary    Reason for therapy discharge:    All goals and outcomes met, no further needs identified.    Progress towards therapy goal(s). See goals on Care Plan in [facility] electronic health record for goal details.  Goals met    Therapy recommendation(s):    No further therapy is recommended.  Pt tolerated session very well. Acute PT goals met at this time. Anticipate when medically ready, pt may discharge to home with assist from spouse as needed with dressing, laundry, groceries, cleaning, use of walker for ambulation, supervision on stairs with cane and rail. No further acute skilled PT needs at this time. Walker order in chart for discharge.  PT Brief overview of current status: bed mob, ind. STS to ALFONZO WILLSONA. Amb with ASHLEY WILLSON.

## 2022-12-25 NOTE — PLAN OF CARE
Goal Outcome Evaluation:         A/O x4. VSS on RA. POD 0 of L great toe amputation. Bedrest until POD1. Purewick in place d/t bedrest. 1 BM this AM. Denies pain, baseline neuropathy in hands and feet. Mod carb diet, BG WNL. Orthotic boot in room. Discharge pending.

## 2022-12-25 NOTE — PROGRESS NOTES
Today is POD #1 of right great toe amputation, dressing covered with ace wraps and no drainage. Patient continues on antibiotic therapy, reports baseline numbness in bilateral hands and feet, no pain at this time. Patient drinking and voiding in adequate amount, sleeping most of NOC. Blood glucose readings this shift did meet criteria for intervention, no new concerns. Patient stable on room air, will keep monitoring.

## 2022-12-25 NOTE — PROGRESS NOTES
12/25/22 1201   Appointment Info   Signing Clinician's Name / Credentials (PT) Carolina Bishop, PT, DPT   Living Environment   People in Home spouse   Current Living Arrangements house   Home Accessibility stairs to enter home   Number of Stairs, Main Entrance 3   Stair Railings, Main Entrance railing on right side (ascending)   Transportation Anticipated family or friend will provide   Living Environment Comments Pt reportts laundry is in the basement but does not need to go down there. All needs on main level.   Self-Care   Usual Activity Tolerance good   Current Activity Tolerance moderate   Equipment Currently Used at Home cane, straight   Fall history within last six months no   Activity/Exercise/Self-Care Comment Pt reports intermittent use of cane due to knee pain otherwise ind with ADLs and IADLs. Pt has access to shower chair and knee scooter.   General Information   Onset of Illness/Injury or Date of Surgery 12/22/22   Referring Physician Belén Lawrence DPM,   Patient/Family Therapy Goals Statement (PT) Planning on going home when able.   Pertinent History of Current Problem (include personal factors and/or comorbidities that impact the POC) Pt is a 57 y.o. female admitted on 12/22/22 with L great toe diabetic foot infection with early sepsis,. PMHx significant for DMII with neuropathy, HTN, HLD, obesity. Pt underwent L great toe amputation on 12/24/22. Pt is POD#1.   Existing Precautions/Restrictions fall;weight bearing   Weight-Bearing Status - LLE other (see comments)  (Minimal heel weight bearing on left foot in post op boot. No more than 10 minutes on foot an hour.)   Cognition   Affect/Mental Status (Cognition) WFL   Orientation Status (Cognition) oriented x 4   Follows Commands (Cognition) WFL   Pain Assessment   Patient Currently in Pain No   Integumentary/Edema   Integumentary/Edema Comments Dressing over L foot appears CDI. Aircast boot forefoot strap appears too short to close with dressing.    Posture    Posture Protracted shoulders   Range of Motion (ROM)   ROM Comment Grossly WFL BUE and LE with functional transfers   Strength (Manual Muscle Testing)   Strength (Manual Muscle Testing) Able to perform L SLR;Able to perform R SLR   Strength Comments Grossly antigravity strength BUE and LE with functional transfers.   Bed Mobility   Comment, (Bed Mobility) Supine HOB flat>sitting EOB, SBA.   Transfers   Comment, (Transfers) Sit>stand to FWW, CGA.   Gait/Stairs (Locomotion)   Distance in Feet 5' eval + 100' treatment   Distance in Feet (Gait) 100'   Comment, (Gait/Stairs) Amb with FWW, step to pattern, SBA, able to maintain heel WBing status in Aircast boot, no LOB.   Balance   Balance Comments Able to maintain seated balance unsupported. Able to maintain standing balance in FWW, SBA.   Sensory Examination   Sensory Perception Comments Pt reports baseline peripheral neuropathy BLE.   Clinical Impression   Criteria for Skilled Therapeutic Intervention Yes, treatment indicated   PT Diagnosis (PT) Impaired gait   Influenced by the following impairments decreased functional strength, decreased activity tolerance, WBing precautions   Functional limitations due to impairments fall risk, impaired functional independence, impaired ADLs and IADLs   Clinical Presentation (PT Evaluation Complexity) Stable/Uncomplicated   Clinical Presentation Rationale per MR and clinical judgement   Clinical Decision Making (Complexity) low complexity   Planned Therapy Interventions (PT) balance training;bed mobility training;cryotherapy;gait training;home exercise program;ROM (range of motion);stair training;strengthening;stretching;patient/family education;orthotic fitting/training;transfer training;progressive activity/exercise   Anticipated Equipment Needs at Discharge (PT) walker, rolling   Risk & Benefits of therapy have been explained evaluation/treatment results reviewed;care plan/treatment goals reviewed;risks/benefits  reviewed;current/potential barriers reviewed;participants voiced agreement with care plan;participants included;patient;spouse/significant other   PT Total Evaluation Time   PT Eval, Low Complexity Minutes (64190) 5   Physical Therapy Goals   PT Frequency One time eval and treatment only   PT Predicted Duration/Target Date for Goal Attainment 12/25/22   PT Goals Bed Mobility;Transfers;Gait;Stairs   PT: Bed Mobility Independent;Supine to/from sit;Within precautions;Goal Met   PT: Transfers Supervision/stand-by assist;Sit to/from stand;Bed to/from chair;Assistive device;Within precautions;Goal Met   PT: Gait Supervision/stand-by assist;Assistive device;Rolling walker;Within precautions;100 feet;Goal Met   PT: Stairs Supervision/stand-by assist;Assistive device;Within precautions;4 stairs;Rail on right;Goal Met   Interventions   Interventions Quick Adds Gait Training;Therapeutic Activity   Therapeutic Activity   Therapeutic Activities: dynamic activities to improve functional performance Minutes (20450) 10   Treatment Detail/Skilled Intervention pt agreed to therapy session. Edu and demo for pt donning aircast boot on L foot. Pt verabalized understanding, due to dressing on L foot the for foot boot strap is now too short. Pt sit<>stand to FWW additional times throughout session, cues for hand placement and walker safety. Pt maxA for doffing brief. Pt Cassi donning underwear at bedside, cues to place L foot through first. Pt sit>supine HOB flat, ind. Edu pt on elevation and icing for pain and swelling. Pt left in bed, all needs in reach, pt tolerated well. Acute PT goals met at this time.   Gait Training   Gait Training Minutes (21830) 24   Symptoms Noted During/After Treatment (Gait Training) fatigue   Treatment Detail/Skilled Intervention demo for pt heel WBing in FWW with step to pattern. Pt amb in room and hallway, cues for short step on R, able to maintain WBing. Demo for pt stair negotiation technique. Pt completed  set of 4 stairs, step to pattern with R leg leading ascending, use of single rail and cane. Demo for pt transfer on and off knee scooter with FWW. Pt trialed, increased time needed for adjustment to knee scooter. Pt trialed two pushed, and reports increased pain in L knee. Aborted knee scooter at this time. Pt amb additional time back to room. Cues for upright gaze and posture. Pt tolerated well.   [place] Level (Gait Training) stand-by assist   Physical Assistance Level (Gait Training) set-up required;supervision;verbal cues;nonverbal cues (demo/gestures);1 person assist   Weight Bearing (Gait Training) other (see comments)   Assistive Device (Gait Training) rolling walker   Gait Analysis Deviations decreased yaneth;decreased step length   Impairments (Gait Analysis/Training) strength decreased;sensation decreased   Stair Railings present on right side   Physical Assist/Nonphysical Assist (Stairs) set-up required;supervision;verbal cues;nonverbal cues (demo/gestures);1 person assist   Level of [place] (Stairs) stand-by assist   Assistive Device (Stairs) straight cane   PT Discharge Planning   PT Plan DC   PT Discharge Recommendation (DC Rec) home with assist   PT Rationale for DC Rec Pt tolerated session very well. Acute PT goals met at this time. Anticipate when medically ready, pt may discharge to home with assist from spouse as needed with dressing, laundry, groceries, cleaning, use of walker for ambulation, supervision on stairs with cane and rail. No further acute skilled PT needs at this time. Walker order in chart for discharge.   PT Brief overview of current status bed mob, ind. STS to ASHLEY WILLSON. Amb with ASHLEY WILLSON.   Total Session Time   Timed Code Treatment Minutes 34   Total Session Time (sum of timed and untimed services) 39

## 2022-12-26 VITALS
HEIGHT: 65 IN | TEMPERATURE: 97.9 F | RESPIRATION RATE: 16 BRPM | SYSTOLIC BLOOD PRESSURE: 149 MMHG | DIASTOLIC BLOOD PRESSURE: 91 MMHG | WEIGHT: 215 LBS | HEART RATE: 75 BPM | OXYGEN SATURATION: 97 % | BODY MASS INDEX: 35.82 KG/M2

## 2022-12-26 LAB
BACTERIA TISS BX CULT: ABNORMAL
BACTERIA TISS BX CULT: ABNORMAL
CREAT SERPL-MCNC: 0.49 MG/DL (ref 0.52–1.04)
GFR SERPL CREATININE-BSD FRML MDRD: >90 ML/MIN/1.73M2
GLUCOSE BLDC GLUCOMTR-MCNC: 139 MG/DL (ref 70–99)
GLUCOSE BLDC GLUCOMTR-MCNC: 161 MG/DL (ref 70–99)
GLUCOSE BLDC GLUCOMTR-MCNC: 209 MG/DL (ref 70–99)
MAGNESIUM SERPL-MCNC: 1.6 MG/DL (ref 1.6–2.3)
PHOSPHATE SERPL-MCNC: 3.3 MG/DL (ref 2.5–4.5)
POTASSIUM BLD-SCNC: 3.8 MMOL/L (ref 3.4–5.3)

## 2022-12-26 PROCEDURE — 99239 HOSP IP/OBS DSCHRG MGMT >30: CPT | Performed by: STUDENT IN AN ORGANIZED HEALTH CARE EDUCATION/TRAINING PROGRAM

## 2022-12-26 PROCEDURE — 83735 ASSAY OF MAGNESIUM: CPT | Performed by: STUDENT IN AN ORGANIZED HEALTH CARE EDUCATION/TRAINING PROGRAM

## 2022-12-26 PROCEDURE — 82565 ASSAY OF CREATININE: CPT | Performed by: PODIATRIST

## 2022-12-26 PROCEDURE — 250N000013 HC RX MED GY IP 250 OP 250 PS 637: Performed by: PODIATRIST

## 2022-12-26 PROCEDURE — 84100 ASSAY OF PHOSPHORUS: CPT | Performed by: STUDENT IN AN ORGANIZED HEALTH CARE EDUCATION/TRAINING PROGRAM

## 2022-12-26 PROCEDURE — 84132 ASSAY OF SERUM POTASSIUM: CPT | Performed by: HOSPITALIST

## 2022-12-26 PROCEDURE — 250N000011 HC RX IP 250 OP 636: Performed by: PODIATRIST

## 2022-12-26 PROCEDURE — 250N000013 HC RX MED GY IP 250 OP 250 PS 637: Performed by: STUDENT IN AN ORGANIZED HEALTH CARE EDUCATION/TRAINING PROGRAM

## 2022-12-26 PROCEDURE — 36415 COLL VENOUS BLD VENIPUNCTURE: CPT | Performed by: PODIATRIST

## 2022-12-26 RX ORDER — CLINDAMYCIN HCL 300 MG
300 CAPSULE ORAL 4 TIMES DAILY
Qty: 56 CAPSULE | Refills: 0 | Status: SHIPPED | OUTPATIENT
Start: 2022-12-26 | End: 2022-12-26

## 2022-12-26 RX ORDER — CLINDAMYCIN HCL 300 MG
300 CAPSULE ORAL 3 TIMES DAILY
Qty: 56 CAPSULE | Refills: 0 | Status: SHIPPED | OUTPATIENT
Start: 2022-12-26 | End: 2022-12-26

## 2022-12-26 RX ORDER — CLINDAMYCIN HCL 300 MG
300 CAPSULE ORAL 3 TIMES DAILY
Qty: 42 CAPSULE | Refills: 0 | Status: SHIPPED | OUTPATIENT
Start: 2022-12-26

## 2022-12-26 RX ORDER — SACCHAROMYCES BOULARDII 250 MG
250 CAPSULE ORAL 2 TIMES DAILY
Qty: 28 CAPSULE | Refills: 0 | Status: SHIPPED | OUTPATIENT
Start: 2022-12-26 | End: 2023-01-09

## 2022-12-26 RX ORDER — ACETAMINOPHEN 325 MG/1
325-650 TABLET ORAL EVERY 4 HOURS PRN
COMMUNITY
Start: 2022-12-27

## 2022-12-26 RX ADMIN — INSULIN ASPART 1 UNITS: 100 INJECTION, SOLUTION INTRAVENOUS; SUBCUTANEOUS at 08:08

## 2022-12-26 RX ADMIN — ACETAMINOPHEN 975 MG: 325 TABLET, FILM COATED ORAL at 13:45

## 2022-12-26 RX ADMIN — LOSARTAN POTASSIUM 25 MG: 25 TABLET, FILM COATED ORAL at 08:10

## 2022-12-26 RX ADMIN — INSULIN ASPART 2 UNITS: 100 INJECTION, SOLUTION INTRAVENOUS; SUBCUTANEOUS at 11:57

## 2022-12-26 RX ADMIN — ACETAMINOPHEN 975 MG: 325 TABLET, FILM COATED ORAL at 04:48

## 2022-12-26 RX ADMIN — AMLODIPINE BESYLATE 10 MG: 10 TABLET ORAL at 08:10

## 2022-12-26 RX ADMIN — METRONIDAZOLE 500 MG: 500 INJECTION, SOLUTION INTRAVENOUS at 01:08

## 2022-12-26 ASSESSMENT — ACTIVITIES OF DAILY LIVING (ADL)
ADLS_ACUITY_SCORE: 19
ADLS_ACUITY_SCORE: 19
ADLS_ACUITY_SCORE: 21
ADLS_ACUITY_SCORE: 19
ADLS_ACUITY_SCORE: 21

## 2022-12-26 NOTE — DISCHARGE SUMMARY
[facility]  Hospitalist Discharge Summary      Date of Admission:  12/22/2022  Date of Discharge:  12/26/2022  Discharging Provider: Andry Garza MD  Discharge Service: Hospitalist Service    Discharge Diagnoses     left great toe osteomyelitis.     Follow-ups Needed After Discharge   Follow-up Appointments     Follow-up and recommended labs and tests       Follow up with Dr. Lawrence in 1-2 weeks from discharge from the hospital.         For appointments, questions or concerns: call: [phone]    Office fax number:  [phone]         Follow-up and recommended labs and tests       Follow up with primary care provider, Lesly Ga V, within 7 days for   hospital follow- up.  No follow up labs or test are needed.             Unresulted Labs Ordered in the Past 30 Days of this Admission     Date and Time Order Name Status Description    12/26/2022 11:40 AM Potassium In process     12/24/2022  9:20 AM Surgical Pathology Exam In process     12/24/2022  9:20 AM Anaerobic Bacterial Culture Routine Preliminary     12/22/2022  3:52 PM Blood Culture Peripheral Blood Preliminary     12/22/2022  3:52 PM Blood Culture Peripheral Blood Preliminary         Discharge Disposition     Discharged to home  Condition at discharge: Stable    Hospital Course    Guerita Gonzalez is a 57 year old female admitted on 12/22/2022    Left great toe diabetic foot infection with concerns for early sepsis  Lactic acidosis  Presents with progressive edema, redness, and ulceration of her left big toe  X-ray reveals no evidence of osteomyelitis  There is also concerns for sepsis with a heart rate of 120s with a lactic acid of 3.2  Pulses are palpable dorsalis pedis  CRP under 44  Plan  - Continue cefepime + Flagyl for anaerobic coverage ->  discharge on oral clindamycin 3x a day for 14 days.   -- Can stop Vancomycin  - MRI of the left foot -> Soft tissue ulceration along the plantar aspect of the great toe underlying the first  interphalangeal joint which appears to communicate with a fluid collection which extends dorsally and medially to the first interphalangeal joint and proximal  phalanx measuring roughly 10 x 13 x 15 mm, with mild edema and enhancement of the first proximal and distal phalanxes also present. Persistent with osteomyelitis.  - Podiatry following -> s/p left great toe amputation 12/24    Diabetes type 2 with neuropathy  Plan  - sliding scale insulin as needed with hypoglycemia protocol  - last a1c was only 6.4 in August  - repeat hemoglobin A1c this admission 6.4    Hypokalemia, hypophosphatemia, hypomagnesemia  Plan  - on replacement protocols      Consultations This Hospital Stay   PHARMACY TO DOSE VANCO  PHARMACY TO DOSE VANCO  PODIATRY IP CONSULT  PHYSICAL THERAPY ADULT IP CONSULT  ORTHOSIS EXTREMITY LOWER REFERRAL IP CONSULT    Code Status   Full Code    Time Spent on this Encounter   I, Andry Garza MD, personally saw the patient today and spent greater than 30 minutes discharging this patient.       Andry Garza MD  [facility]  [address] [address]  Phone: [phone]  Fax: [phone]  ______________________________________________________________________    Physical Exam   Vital Signs: Temp: 97.9  F (36.6  C) Temp src: Oral BP: (!) 149/91 Pulse: 75   Resp: 16 SpO2: 97 % O2 Device: None (Room air)    Weight: 215 lbs 0 oz  ----------------------------------------------------------------------------------------       Primary Care Physician   Lesly Ga V    Discharge Orders      Follow-up and recommended labs and tests     Follow up with Dr. Lawrence in 1-2 weeks from discharge from the hospital.         For appointments, questions or concerns: call: [phone]    Office fax number:  [phone]     Activity    Your activity upon discharge:   left foot.   Minimal heel weight bearing in post op boot. No more than 10 minutes an hour on the foot.  Elevate foot  above the heart.     Wound care and dressings    Instructions to care for your wound at home:   left foot --   keep foot and dressing dry. Bag up if you shower. Will change dressing at first clinic visit.     Reason for your hospital stay    You had a foot infection.     Follow-up and recommended labs and tests     Follow up with primary care provider, Lesly Ga V, within 7 days for hospital follow- up.  No follow up labs or test are needed.     Activity    Your activity upon discharge: activity as tolerated     Walker Order for DME - ONLY FOR DME    I, the undersigned, certify that the above prescribed supplies are medically necessary for this patient and is both reasonable and necessary in reference to accepted standards of medical and necessary in reference to accepted standards of medical practice in the treatment of this patient's condition and is not prescribed as a convenience.      Diet    Follow this diet upon discharge: Orders Placed This Encounter      Advance Diet as Tolerated: Fully Advanced to diet(s) per Provider order; Moderate Consistent Carb (60 g CHO per Meal) Diet       Significant Results and Procedures   Most Recent 3 CBC's:  Recent Labs   Lab Test 12/23/22  0551 12/22/22  1556 06/15/21  1250   WBC 7.3 9.8 15.2*   HGB 10.5* 12.6 14.4   MCV 89 91 90    291 341     Most Recent 3 BMP's:  Recent Labs   Lab Test 12/26/22  1137 12/26/22  0759 12/26/22  0729 12/26/22  0219 12/25/22  2311 12/25/22  1716 12/25/22  1543 12/25/22  1139 12/25/22  0757 12/24/22  1001 12/24/22  0658 12/23/22  0930 12/23/22  0551 12/22/22  2133 12/22/22  1556 06/15/21  1250   0000   NA  --   --   --   --   --   --   --   --   --   --   --   --  138  --  135 138  --    POTASSIUM  --   --   --   --  3.6  --  3.2*  --  3.2*  --  3.6   < > 3.5   < > 2.7* 3.4  --    CHLORIDE  --   --   --   --   --   --   --   --   --   --   --   --  107  --  101 103  --    CO2  --   --   --   --   --   --   --   --   --   --   --   --  20   --  22 22  --    BUN  --   --   --   --   --   --   --   --   --   --   --   --  4*  --  7 7  --    CR  --   --  0.49*  --   --   --   --   --  0.53  --  0.52  --  0.51*  --  0.60 0.58   < >   ANIONGAP  --   --   --   --   --   --   --   --   --   --   --   --  11  --  12 13  --    GREGORY  --   --   --   --   --   --   --   --   --   --   --   --  8.5  --  9.2 9.6  --    * 161*  --  139*  --    < >  --    < >  --    < >  --    < > 153*   < > 134* 185*   < >    < > = values in this interval not displayed.     Most Recent 2 LFT's:  Recent Labs   Lab Test 12/23/22  0551 06/15/21  1250   AST 14 55*   ALT 12 72*   ALKPHOS 66 94   BILITOTAL 1.0 0.6     Most Recent 3 INR's:  Recent Labs   Lab Test 12/22/22  1557   INR 1.19*     Most Recent 3 Troponin's:No lab results found.  Most Recent 3 BNP's:No lab results found.  7-Day Micro Results     Collected Updated Procedure Result Status      12/24/2022 0920 12/26/2022 1403 Anaerobic Bacterial Culture Routine [[id number]]   Tissue from Toe, Left    Preliminary result Component Value   Culture No anaerobic organisms isolated after 2 days  [P]                12/24/2022 0920 12/26/2022 1102 Tissue Aerobic Bacterial Culture Routine [[id number]]   (Abnormal)   Tissue from Toe, Left    Final result Component Value   Culture 2+ Streptococcus agalactiae (Group B Streptococcus)    This organism is susceptible to ampicillin, penicillin, vancomycin and the cephalosporins. If treatment is required and your patient is allergic to penicillin, contact the microbiology lab within 5 days to request susceptibility testing.    2+ Normal vasyl               12/22/2022 1651 12/25/2022 1903 Blood Culture Peripheral Blood [[id number]]   Peripheral Blood    Preliminary result Component Value   Culture No growth after 3 days  [P]                12/22/2022 1648 12/24/2022 1239 Wound Aerobic Bacterial Culture Routine with Gram Stain [[id number]]   (Abnormal)   Wound from Toe, Left    Final  result Component Value   Culture 2+ Streptococcus agalactiae (Group B Streptococcus)    This organism is susceptible to ampicillin, penicillin, vancomycin and the cephalosporins. If treatment is required and your patient is allergic to penicillin, contact the microbiology lab within 5 days to request susceptibility testing.   Gram Stain Result 2+ Gram positive cocci    3+ WBC seen    Predominantly PMNs               12/22/2022 1557 12/25/2022 1903 Blood Culture Peripheral Blood [[id number]]   Peripheral Blood    Preliminary result Component Value   Culture No growth after 3 days  [P]                    Most Recent ESR & CRP:  Recent Labs   Lab Test 12/22/22  1556   SED 63*   .0*   ,   Results for orders placed or performed during the hospital encounter of 12/22/22   XR Foot Left 3 Views    Narrative    EXAM: XR FOOT LEFT G/E 3 VIEWS  LOCATION: [facility]  DATE/TIME: 12/22/2022 4:34 PM    INDICATION: Swelling, infection  COMPARISON: None.      Impression    IMPRESSION: Marked soft tissue swelling involving the great toe and medial forefoot. Superficial gas bubble projecting plantar to the IP joint great toe may be related to an ulceration. No bony abnormality suggest osteomyelitis. Normal joint spaces.   MR Foot Left w/o & w Contrast    Narrative    EXAM: MR FOOT LEFT W/O and W CONTRAST  LOCATION: [facility]  DATE/TIME: 12/23/2022 12:58 AM    INDICATION: diabetic foot infection  COMPARISON:  x-rays dated 12/22/2022  TECHNIQUE: Routine. Additional postgadolinium T1 sequences were obtained.  IV CONTRAST: 10 mL GADAVIST    FINDINGS: Multiple sequences are degraded due to artifact.    JOINTS AND BONES:   -There is subtle increased bone marrow edema and enhancement seen in the first proximal phalanx and first distal phalanx centered at the first interphalangeal joint. Remainder the bony structures are unremarkable.    Degenerative changes of proximal midfoot are  noted.    TENDONS:   -Inflammation and edema are seen in the extensor halluces longus tendon at the level of the first interphalangeal joint (image 10, series 10& 6)     LIGAMENTS:   -Lisfranc ligament: Intact. No subluxation.    MUSCLES AND SOFT TISSUES:   -Soft tissue ulceration of the plantar aspect of the great toe is noted, with soft tissue edema and enhancement surrounding the great toe. There is a nonenhancing fistulous connection on the plantar aspect of the great toe which extends to the underlying   asymmetry of the first distal phalanx (image 8, series 10). This communicates with a fluid collection in the dorsal and medial aspect of the great toe measuring 10 x 13 x 15 mm (image 7, series 10; image 5, series 11).    Soft tissue edema and enhancement is is present in the remainder of the first toe with less pronounced edema and enhancement seen in the proximal forefoot, most pronounced along the dorsum.     Fatty atrophy of the intrinsic muscles of the foot is noted.      Impression    IMPRESSION:  1.  Soft tissue ulceration along the plantar aspect of the great toe underlying the first interphalangeal joint which appears to communicate with a fluid collection which extends dorsally and medially to the first interphalangeal joint and proximal   phalanx measuring roughly 10 x 13 x 15 mm, with mild edema and enhancement of the first proximal and distal phalanxes also present. Persistent with osteomyelitis of the first distal and proximal phalanx centered at the first interphalangeal joint with a   developing abscess/phlegmon which communicates with the soft tissue ulcer underlying the first interphalangeal joint.  2.  Inflammation and edema of the extensor pollicis longus tendon at the level of the first interphalangeal joint, favored reflect infectious tenosynovitis, no evidence of shraddha disruption of the tendon is seen at this time.     X-ray lt Foot 3 vw port: In PACU    Narrative    EXAM: XR FOOT PORT  LEFT 3 VIEWS  LOCATION: [facility]  DATE/TIME: 12/24/2022 2:04 PM    INDICATION: post op  COMPARISON: 12/22/2022      Impression    IMPRESSION: Interval amputation of the great toe. Soft tissue swelling. No new bony abnormality.       Discharge Medications   Current Discharge Medication List      START taking these medications    Details   acetaminophen (TYLENOL) 325 MG tablet Take 1-2 tablets (325-650 mg) by mouth every 4 hours as needed for other (For optimal non-opioid multimodal pain management to improve pain control.)    Associated Diagnoses: Osteomyelitis of great toe of left foot (H)      clindamycin (CLEOCIN) 300 MG capsule Take 1 capsule (300 mg) by mouth 3 times daily  Qty: 42 capsule, Refills: 0    Associated Diagnoses: Osteomyelitis of great toe of left foot (H)      saccharomyces boulardii (FLORASTOR) 250 MG capsule Take 1 capsule (250 mg) by mouth 2 times daily for 14 days  Qty: 28 capsule, Refills: 0    Associated Diagnoses: Osteomyelitis of great toe of left foot (H)         CONTINUE these medications which have NOT CHANGED    Details   amLODIPine (NORVASC) 10 MG tablet Take 10 mg by mouth daily.      aspirin 81 MG EC tablet Take 81 mg by mouth every evening      Cholecalciferol (VITAMIN D) 2000 units tablet Take 2,000 Units by mouth daily      cyanocobalamin (VITAMIN B-12) 1000 MCG tablet Take 1,000 mcg by mouth every 28 (twenty-eight) days      glimepiride (AMARYL) 2 MG tablet Take 2 mg by mouth every morning (before breakfast)      losartan (COZAAR) 25 MG tablet Take 25 mg by mouth daily      metFORMIN (GLUCOPHAGE) 1000 MG tablet Take 1,000 mg by mouth 2 times daily (with meals)      simvastatin (ZOCOR) 10 MG tablet Take 10 mg by mouth At Bedtime      blood glucose monitoring ([employer] TEST STRIPS) test strip Dispense test strips covered by the patient insurance. Test 2-4 times per day.      Blood Glucose Monitoring Suppl ([employer] BLOOD GLUCOSE MONITOR) w/Device KIT  Dispense Accucheck Deana glucose meter, test strips and lancets covered by the patient insurance. Test 2-4  times per day.         STOP taking these medications       levofloxacin (LEVAQUIN) 500 MG tablet Comments:   Reason for Stopping:         metroNIDAZOLE (FLAGYL) 500 MG tablet Comments:   Reason for Stopping:             Allergies   Allergies   Allergen Reactions     Penicillins      hives

## 2022-12-26 NOTE — PLAN OF CARE
Goal Outcome Evaluation:  Patient vital signs are at baseline: yes  Patient able to ambulate as they were prior to admission or with assist devices provided by therapies during their stay:  yes, L foot boot and walker  Patient MUST void prior to discharge:  Yes, without difficulty  Patient able to tolerate oral intake:  Yes, diabetic diet.  Pain has adequate pain control using Oral analgesics:  Yes, pt denies pain (due to chronic neuropathy).  Does patient have an identified :  Yes,  Edwin.  Has goal D/C date and time been discussed with patient: Yes, today    Pt discharged to home today, VSS, AVS printed and discussed with verbal understanding, all belongings sent, medications discussed and sent with pt, diabetic education given, IV removed, pt taken via wheelchair to door 6 and picked up by  Edwin.

## 2022-12-26 NOTE — PROGRESS NOTES
Today is POD #2 of left great toe amputation, dressing covered with ace wraps and CDI. Patient continues to deny pain perhaps due to baseline neuropathy in hands and feet. Partial weight bearing to left foot, up to bathroom with standby assist. Blood glucose readings at bedtime and overnight did not meet criteria for intervention. No new concerns at this time, patient hopes to discharge today.

## 2022-12-26 NOTE — OP NOTE
Procedure Date: 12/24/2022    SURGEON:  Belén Lawrence DPM    PREOPERATIVE DIAGNOSES:     1.  Diabetic with neuropathy.  2.  Ulceration, left great toe.  3.  Osteomyelitis, left great toe.    POSTOPERATIVE DIAGNOSES:     1.  Diabetic with neuropathy.  2.  Ulceration, left great toe.  3.  Osteomyelitis, left great toe.    PROCEDURE PERFORMED:  Left great toe amputation at metatarsophalangeal joint.    ANESTHESIA:  MAC with local.    HEMOSTASIS:  Electrocautery.    ESTIMATED BLOOD LOSS:  25 mL    SPECIMENS:  Left great toe for path and culture.    MATERIALS:  None.    INDICATIONS FOR PROCEDURE:  Ms. Gonzalez is a 57-year-old female that presented to the Emergency Department with worsening left great toe infection.  She has had the ulcer for a while and noticed that it started to become more red and swollen a week ago.  She was started on oral antibiotics, but it continued to progress and worsen.  MRI shows osteomyelitis in the distal and proximal phalanx of the left great toe.  It was discussed with the patient to go in and remove the great toe to help get the bone infection under control and to try to prevent spreading infection of the foot.  Risks, benefits, and complications were discussed with the patient.  No guarantees were made.  The patient wished to proceed with surgery.    DESCRIPTION OF PROCEDURE:  The patient was brought to the operating room and placed on the operating table in supine position.  Anesthesia was administered and local was injected.  The foot was prepped and draped using sterile technique.  Attention was directed to the distal aspect of the left foot.  A fishmouth incision was made around the distal one-half of the left great toe, full thickness down to bone with a #15 blade around the entire toe.  The tissue was sharply dissected off of the base of the proximal phalanx.  This excised the ulcer.  The joint was then disarticulated at the metatarsophalangeal joint and the toe was sent for path and  culture.  The wound was flushed with copious amounts of normal saline.  Bleeding was controlled with electrocautery.  A 3-0 Prolene was used to reapproximate the skin edges.  The foot was placed in dry sterile dressing.  The patient tolerated the procedure and anesthesia well and was transferred to recovery with vital signs stable and vascular status intact.  The patient will be minimal heel weightbearing in a postop boot.    Belén Lawrence DPM        D: 2022   T: 2022   MT: IRVING    Name:     JESS MEADESanjuanita  MRN:      [id number]        Account:        [id number]   :      1965           Procedure Date: 2022     Document: [id number]

## 2022-12-27 LAB
BACTERIA BLD CULT: NO GROWTH
BACTERIA BLD CULT: NO GROWTH

## 2022-12-28 LAB
PATH REPORT.COMMENTS IMP SPEC: NORMAL
PATH REPORT.COMMENTS IMP SPEC: NORMAL
PATH REPORT.FINAL DX SPEC: NORMAL
PATH REPORT.GROSS SPEC: NORMAL
PATH REPORT.MICROSCOPIC SPEC OTHER STN: NORMAL
PATH REPORT.RELEVANT HX SPEC: NORMAL
PHOTO IMAGE: NORMAL

## 2022-12-29 ENCOUNTER — TELEPHONE (OUTPATIENT)
Dept: PODIATRY | Facility: CLINIC | Age: 57
End: 2022-12-29

## 2022-12-29 NOTE — TELEPHONE ENCOUNTER
"[facility] Call Center    Phone Message    May a detailed message be left on voicemail: yes     Reason for Call: Form or Letter   Type or form/letter needing completion: Work restrictions to be off of foot until she can put full pressure on foot.     Provider: Dr. Belén Lawrence    Date form needed: 01/01/2023    Once completed: Fax form to: Send letter to \"My chart\" account    Pt had surgery with Dr. Lawrence 12/24 and is off of work until 01/02/2023.  Pt asking for letter to remain off of foot.  Pt currently scheduled for follow up with Dr. Lawrence on 01/04/23       Action Taken: Other: Dr. Belén Lawrence    Travel Screening: Not Applicable                                                                        "

## 2022-12-29 NOTE — LETTER
REPORT OF WORK ABILITY    [facility]  [address]  [place] [address]  [phone]      Employee Name: Guerita Gonzalez        : 1965         Today's date: 2022    To whom it may concern:    Patient underwent foot surgery on 2022.  She is to be nonweightbearing for the next 6 weeks (2/10/2022) to allow the foot to heal.  Please excuse her from work during this time.  Please call with questions or concerns.               Belén Lawrence DPM

## 2022-12-30 NOTE — TELEPHONE ENCOUNTER
Letter written.  She can get it  in her [employer] account.    Please let patient know.     Thanks.    Belén Lawrence DPM

## 2022-12-31 LAB — BACTERIA TISS BX CULT: ABNORMAL

## 2023-01-04 ENCOUNTER — OFFICE VISIT (OUTPATIENT)
Dept: PODIATRY | Facility: CLINIC | Age: 58
End: 2023-01-04
Attending: PODIATRIST
Payer: COMMERCIAL

## 2023-01-04 VITALS — WEIGHT: 215 LBS | BODY MASS INDEX: 35.78 KG/M2 | SYSTOLIC BLOOD PRESSURE: 140 MMHG | DIASTOLIC BLOOD PRESSURE: 92 MMHG

## 2023-01-04 DIAGNOSIS — Z98.890 POST-OPERATIVE STATE: ICD-10-CM

## 2023-01-04 DIAGNOSIS — E11.42 DIABETIC POLYNEUROPATHY ASSOCIATED WITH TYPE 2 DIABETES MELLITUS (H): Primary | ICD-10-CM

## 2023-01-04 DIAGNOSIS — S98.112A AMPUTATION OF LEFT GREAT TOE (H): ICD-10-CM

## 2023-01-04 DIAGNOSIS — E66.01 MORBID OBESITY (H): ICD-10-CM

## 2023-01-04 PROCEDURE — 99024 POSTOP FOLLOW-UP VISIT: CPT | Performed by: PODIATRIST

## 2023-01-04 NOTE — PATIENT INSTRUCTIONS
"Thank you for choosing [facility] Podiatry / Foot & Ankle Surgery!    DR KEN'S CLINIC:  [facility]   [address]   [place] [address]      TRIAGE LINE: [phone]  APPOINTMENTS: [phone]  RADIOLOGY: [phone]  SET UP SURGERY: [phone]  PHYSICAL THERAPY: [phone]   FAX NUMBER: [phone]  BILLING QUESTIONS: [phone]       Follow up: Weekly x 4 weeks      DIABETES AND YOUR FEET  Diabetes can result in several problems in the feet including ulcers (open sores) and amputations. Two of the most important reasons why people develop foot problems when they have diabetes is : 1. Neuropathy (loss of feeling)  2. Vascular disease (loss or decrease of blood flow).    Neuropathy is a term used to describe a loss of nerve function.  Patients with diabetes are at risk of developing neuropathy if their sugars continue to run high and are above the normal value. One theory for neuropathy is that the \"extra\" sugar in the body enters the nerves and is broken down. These by-products build up in the nerve causing it to swell and impairing nerve function. Often times, this can be prevented by controlling your sugars, dieting and exercise.    When a person develops neuropathy, they usually begin to feel numbness or tingling in their feet and sometime in their legs.  Other symptoms may include painful burning or hot feet, tingling or feeling like insects or ants are crawling on your feet or legs.  If the diabetes is sever and the sugars run high for long periods of time, neuropathy can also occur in the hands.    Vascular disease  is a term used to describe a loss or decrease in circulation (blood flow). There is a problem in getting blood and oxygen to areas that need it. Similar to neuropathy, sugars can build up in the walls of the arteries (blood vessels) and cause them to become swollen, thickened and hardened. This decreases the amount of blood that can go to an area that " needs it. Though this is common in the legs of diabetic patients, it can also affect other arteries (blood vessels) in the body such as in the heart and eyes.    In the legs, vascular disease usually results in cramping. Patients who develop leg cramps after walking the same distance every time (i.e. One block, half a mile, ect.) need to let their doctors know so that their circulation may be checked. Cramps causing severe pain in the feet and/or legs while sleeping and the cramps go away when you stand or hang your legs off the side of the bed, may also be a sign of poor blood circulation.  Occasional cramping in cold weather or on rare occasions with activity may not be due to poor circulation, but you should inform your doctor.    PREVENTION OF THESE DISEASES  The key to prevention is good blood sugar control. Poor blood sugar control is a big reason many of these problems start. Physical activity (exercise) is a very good way to help decrease your blood sugars. Exercise can lower your blood sugar, blood pressure, and cholesterol. It also reduces your risk for heart disease and stroke, relieves stress, and strengthens your heart, muscles and bones.  In addition, regular activity helps insulin work better, improves your blood circulation, and keeps your joints flexible. If you're trying to lose weight, a combination of exercise and wise food choices can help you reach your target weight and maintain it.      PAIN MANAGEMENT (**Please speak with your primary doctor about any medications**)  1.Blood Sugar Control - Most important  2. Medications such as:  Amytriptylline, duloxetine, gabapentin, lyrica, tramadol (talk with your primary care doctor about this).     NUTRITION:  Nutrition is also important to help with healing. If your body does not have what it needs, it can't heal.   Increasing your protein intake is important.  With wounds you need 60-90gm of protein a day to help with healing. Over the counter  "protein shakes such as Jorge, Glucerna, Ensure, ect... can help to supplement your daily protein intake.   It is also important to take Vitamins to help with healing.  Vitamins such as B12, B6 and Vitamin D3 are important for healing. These can be gotten over the counter at pharmacies or at stores like [employer] or the [employer].    I can also prescribe a dietary supplement called \"Rheumate\" that has a lot of essential vitamins in one capsule.  This may not be covered by insurance though.     FOOT CARE RECOMMENDATIONS   1. Wash your feet with lukewarm water and a mild soap and then dry them thoroughly, especially between the toes.     2. Examine your feet daily looking for cuts, corns, blisters, cracks, ect, especially after wearing new shoes. Make sure to look between your toes. If you cannot see the bottom of your feet, set a mirror on the floor and hold your foot over it, or ask a spouse, friend or family member to examine your feet for you. Contact your doctor immediately if new problems are noted or if sores are not healing.     3. Immediately apply moisturizer to the tops and bottoms of your feet, avoiding areas between the toes. Hand lotion (Intesive Care, Jane, Eucerin, Neutrogena, Curel, ect) is sufficient unless your doctor prescribes a medicated lotion. Apply sunscreen to your feet when going swimming outside.     4. Use clean comfortable shoes, wear white socks (if you have any bleeding or drainage, you will see it on white socks). Socks should not have thick seams or cut off the circulation around the leg. Break in new shoes slowly and rotate with older shoes until broken in. Check the inside of your shoes with your hand to look for areas of irritation or objects that may have fallen into your shoes.       5. Keep slippers by the side of your bed for use during the night.     6.  Shoes should be fitted by a professional and should not cause areas of irritation.  Check your feet regularly when wearing a " new pair of shoes and replace them as needed.     7.  Talk to your doctor about proper exercise. Exercise and stretching stimulate blood flow to your feet and maintain proper glucose levels.     8.  Monitor your blood glucose level as instructed by your doctor. Notify your doctor immediately if your blood sugar is abnormally high or low.    9. Cut your nails straight across, but then gently round any sharp edges with a cardboard nail file. If you have neuropathy, peripheral vascular disease or cannot see that well to trim your own toenails contact [employer] ([phone]) or [employer] ([phone]).      THINGS TO AVOID DOING   1.  Do not soak your feet if you have an open sore. Use only lukewarm water and always check the temperature with your hand as hot water can easily burn your feet.       2.  Never use a hot water bottle or heating pad on your feet. Also do not apply cold compresses to your feet. With decreased sensation, you could burn or freeze your feet.       3.  Do not apply any of these to your feet:    -  Over the counter medicine for corns or warts    -  Harsh chemicals like boric acid    -  Do not self-treat corns, cuts, blisters or infections. Always consult your doctor.       4.  Do not wear sandals, slippers or walk barefoot, especially on hot sand or concrete or other harsh surfaces.     5.  If you smoke, stop!!!

## 2023-01-04 NOTE — LETTER
1/4/2023         RE: Guerita Gonzalez  [address]  [place] [address]        Dear Colleague,    Thank you for referring your patient, Guerita Gonzalez, to the [facility] PODIATRY. Please see a copy of my visit note below.    Podiatry / Foot and Ankle Surgery Progress Note    January 4, 2023    Subject: Patient was seen for left great toe amputation due to osteomyelitis.  Patient is here with her .  She has been minimal heel weightbearing in the boot on the left foot.  Denies fever, nausea, vomiting.  No pain but has baseline neuropathy.    Objective:  Vitals: BP (!) 140/92   Wt 97.5 kg (215 lb)   LMP  (LMP Unknown)   BMI 35.78 kg/m      A1C: 6.4 (12/22/2022)    General:  Patient is alert and orientated.  NAD.    Vascular:  DP and PT pulses are palpable.  No edema or varicosities noted.  CFT's < 3secs.  Skin temp is normal.    Neuro:  Light and gross touch sensation absent to feet.    Derm: Dressing is clean dry and intact to the left foot.  Sutures are intact.  No redness, dehiscence or signs of acute infection noted.    Musculoskeletal: Left great toe was amputated at the metatarsal phalangeal joint..      Assessment:    Diabetic polyneuropathy associated with type 2 diabetes mellitus (H)  Morbid obesity (H)  Post-operative state  Amputation of left great toe (H)      Medical Decision Making/Plan: Dressing was changed today.  They will continue to keep the foot and dressing dry.  We will have him follow-up weekly for dressing changes and foot assessment until the stitches are ready to come out between the 4-6 weeks surgery point.  No concerns today.  All questions were answered to patient satisfaction and they will call for the questions or concerns.      Patient Risk Factor:  Patient is a medium risk factor for infection.     Belén Lawrence DPM, Podiatry/Foot and Ankle Surgery        Again, thank you for allowing me to participate in the care of your patient.         Sincerely,        Belén Lawrence DPM, Podiatry/Foot and Ankle Surgery

## 2023-01-04 NOTE — PROGRESS NOTES
Podiatry / Foot and Ankle Surgery Progress Note    January 4, 2023    Subject: Patient was seen for left great toe amputation due to osteomyelitis.  Patient is here with her .  She has been minimal heel weightbearing in the boot on the left foot.  Denies fever, nausea, vomiting.  No pain but has baseline neuropathy.    Objective:  Vitals: BP (!) 140/92   Wt 97.5 kg (215 lb)   LMP  (LMP Unknown)   BMI 35.78 kg/m      A1C: 6.4 (12/22/2022)    General:  Patient is alert and orientated.  NAD.    Vascular:  DP and PT pulses are palpable.  No edema or varicosities noted.  CFT's < 3secs.  Skin temp is normal.    Neuro:  Light and gross touch sensation absent to feet.    Derm: Dressing is clean dry and intact to the left foot.  Sutures are intact.  No redness, dehiscence or signs of acute infection noted.    Musculoskeletal: Left great toe was amputated at the metatarsal phalangeal joint..      Assessment:    Diabetic polyneuropathy associated with type 2 diabetes mellitus (H)  Morbid obesity (H)  Post-operative state  Amputation of left great toe (H)      Medical Decision Making/Plan: Dressing was changed today.  They will continue to keep the foot and dressing dry.  We will have him follow-up weekly for dressing changes and foot assessment until the stitches are ready to come out between the 4-6 weeks surgery point.  No concerns today.  All questions were answered to patient satisfaction and they will call for the questions or concerns.      Patient Risk Factor:  Patient is a medium risk factor for infection.     Belén Lawrence DPM, Podiatry/Foot and Ankle Surgery

## 2023-01-13 ENCOUNTER — OFFICE VISIT (OUTPATIENT)
Dept: PODIATRY | Facility: CLINIC | Age: 58
End: 2023-01-13
Payer: COMMERCIAL

## 2023-01-13 VITALS
WEIGHT: 215 LBS | BODY MASS INDEX: 35.82 KG/M2 | HEIGHT: 65 IN | SYSTOLIC BLOOD PRESSURE: 120 MMHG | DIASTOLIC BLOOD PRESSURE: 72 MMHG

## 2023-01-13 DIAGNOSIS — Z98.890 POST-OPERATIVE STATE: ICD-10-CM

## 2023-01-13 DIAGNOSIS — E66.01 MORBID OBESITY (H): ICD-10-CM

## 2023-01-13 DIAGNOSIS — S98.112A AMPUTATION OF LEFT GREAT TOE (H): ICD-10-CM

## 2023-01-13 DIAGNOSIS — E11.42 DIABETIC POLYNEUROPATHY ASSOCIATED WITH TYPE 2 DIABETES MELLITUS (H): Primary | ICD-10-CM

## 2023-01-13 PROCEDURE — 99024 POSTOP FOLLOW-UP VISIT: CPT | Performed by: PODIATRIST

## 2023-01-13 NOTE — PROGRESS NOTES
"orthoPodiatry / Foot and Ankle Surgery Progress Note    January 13, 2023    Subject: Patient was seen for 3 weeks s/p left great toe amputation due to osteomyelitis.  Patient is here with her .  She has been minimal heel weightbearing in the boot on the left foot.  Denies fever, nausea, vomiting.  No pain but has baseline neuropathy.     Objective:  Vitals: /72   Ht 1.651 m (5' 5\")   Wt 97.5 kg (215 lb)   LMP  (LMP Unknown)   BMI 35.78 kg/m      A1C: 6.4 (12/22/2022)     General:  Patient is alert and orientated.  NAD.     Vascular:  DP and PT pulses are palpable.  No edema or varicosities noted.  CFT's < 3secs.  Skin temp is normal.     Neuro:  Light and gross touch sensation absent to feet.     Derm: Dressing is clean dry and intact to the left foot.  Sutures are intact.  No redness, dehiscence or signs of acute infection noted.     Musculoskeletal: Left great toe was amputated at the metatarsal phalangeal joint..       Assessment:    Diabetic polyneuropathy associated with type 2 diabetes mellitus (H)  Morbid obesity (H)  Post-operative state  Amputation of left great toe (H)        Medical Decision Making/Plan: Dressing was changed today.  They will continue to keep the foot and dressing dry.    We will have her follow-up in 1 week for possible suture removal.  She was given an order for diabetic shoes and inserts to be fitted for these as we will transition to them once the sutures are removed. No concerns today.  All questions were answered to patient satisfaction and they will call for the questions or concerns.        Patient Risk Factor:  Patient is a medium risk factor for infection.      Belén Lawrence DPM, Podiatry/Foot and Ankle Surgery    "

## 2023-01-13 NOTE — LETTER
"    1/13/2023         RE: Guerita Gonzalez  [address]  [place][1] [address]        Dear Colleague,    Thank you for referring your patient, Guerita Gonzalez, to the [facility] PODIATRY. Please see a copy of my visit note below.    orthoPodiatry / Foot and Ankle Surgery Progress Note    January 13, 2023    Subject: Patient was seen for 3 weeks s/p left great toe amputation due to osteomyelitis.  Patient is here with her .  She has been minimal heel weightbearing in the boot on the left foot.  Denies fever, nausea, vomiting.  No pain but has baseline neuropathy.     Objective:  Vitals: /72   Ht 1.651 m (5' 5\")   Wt 97.5 kg (215 lb)   LMP  (LMP Unknown)   BMI 35.78 kg/m      A1C: 6.4 (12/22/2022)     General:  Patient is alert and orientated.  NAD.     Vascular:  DP and PT pulses are palpable.  No edema or varicosities noted.  CFT's < 3secs.  Skin temp is normal.     Neuro:  Light and gross touch sensation absent to feet.     Derm: Dressing is clean dry and intact to the left foot.  Sutures are intact.  No redness, dehiscence or signs of acute infection noted.     Musculoskeletal: Left great toe was amputated at the metatarsal phalangeal joint..       Assessment:    Diabetic polyneuropathy associated with type 2 diabetes mellitus (H)  Morbid obesity (H)  Post-operative state  Amputation of left great toe (H)        Medical Decision Making/Plan: Dressing was changed today.  They will continue to keep the foot and dressing dry.    We will have her follow-up in 1 week for possible suture removal.  She was given an order for diabetic shoes and inserts to be fitted for these as we will transition to them once the sutures are removed. No concerns today.  All questions were answered to patient satisfaction and they will call for the questions or concerns.        Patient Risk Factor:  Patient is a medium risk factor for infection.      Belén Lawrence DPM, Podiatry/Foot and " Ankle Surgery        Again, thank you for allowing me to participate in the care of your patient.        Sincerely,        Belén Lawrence DPM, Podiatry/Foot and Ankle Surgery

## 2023-01-20 ENCOUNTER — OFFICE VISIT (OUTPATIENT)
Dept: PODIATRY | Facility: CLINIC | Age: 58
End: 2023-01-20
Payer: COMMERCIAL

## 2023-01-20 VITALS — BODY MASS INDEX: 35.78 KG/M2 | WEIGHT: 215 LBS | SYSTOLIC BLOOD PRESSURE: 118 MMHG | DIASTOLIC BLOOD PRESSURE: 72 MMHG

## 2023-01-20 DIAGNOSIS — S98.112A AMPUTATION OF LEFT GREAT TOE (H): ICD-10-CM

## 2023-01-20 DIAGNOSIS — E11.42 DIABETIC POLYNEUROPATHY ASSOCIATED WITH TYPE 2 DIABETES MELLITUS (H): Primary | ICD-10-CM

## 2023-01-20 DIAGNOSIS — E66.01 MORBID OBESITY (H): ICD-10-CM

## 2023-01-20 DIAGNOSIS — Z98.890 POST-OPERATIVE STATE: ICD-10-CM

## 2023-01-20 PROCEDURE — 99213 OFFICE O/P EST LOW 20 MIN: CPT | Performed by: PODIATRIST

## 2023-01-20 NOTE — PROGRESS NOTES
Podiatry / Foot and Ankle Surgery Progress Note    January 20, 2023    Subject: Patient was seen for 4 weeks s/p left great toe amputation due to osteomyelitis.  Patient is here with her .  She has been minimal heel weightbearing in the boot on the left foot.  Denies fever, nausea, vomiting.  No pain but has baseline neuropathy.      Objective:  Vitals: /72   Wt 97.5 kg (215 lb)   LMP  (LMP Unknown)   BMI 35.78 kg/m    BMI= Body mass index is 35.78 kg/m .    A1C: 6.4 (12/22/2022)     General:  Patient is alert and orientated.  NAD.     Vascular:  DP and PT pulses are palpable.  No edema or varicosities noted.  CFT's < 3secs.  Skin temp is normal.     Neuro:  Light and gross touch sensation absent to feet.     Derm: Dressing is clean dry and intact to the left foot.  Sutures are intact.  No redness, dehiscence or signs of acute infection noted.     Musculoskeletal: Left great toe was amputated at the metatarsal phalangeal joint..       Assessment:    Diabetic polyneuropathy associated with type 2 diabetes mellitus (H)  Morbid obesity (H)  Post-operative state  Amputation of left great toe (H)        Medical Decision Making/Plan: At this time, the sutures were removed.  She can get the foot wet.  She can start putting full weight on the foot in the boot.  We will have her wear the boot for the next 2 weeks and then in 3 weeks she can start transitioning to shoes around the house in the boot outside of the house.  At 4 weeks she can just be in shoes at all times.  They are going to make an appointment for the diabetic shoes and inserts.  We will have her follow-up in 6 weeks for reassessment.  All questions were answered to patient satisfaction and they will call for the questions or concerns.        Patient Risk Factor:  Patient is a medium risk factor for infection.    Belén Lawrence DPM, Podiatry/Foot and Ankle Surgery

## 2023-01-20 NOTE — PATIENT INSTRUCTIONS
Thank you for choosing [facility] Podiatry / Foot & Ankle Surgery!    DR KEN'S CLINIC:  [facility]   [address]   [place] [address]      TRIAGE LINE: [phone]  APPOINTMENTS: [phone]  RADIOLOGY: [phone]  SET UP SURGERY: [phone]  PHYSICAL THERAPY: [phone]   FAX NUMBER: [phone]  BILLING QUESTIONS: [phone]       Follow up: 6 weeks      SCAR CARE PROTOCOL  Scarring is an unfortunate but unavoidable part of surgery.  Every person scars differently and there is no way to predict how an individual's final scar will look.  Now that the sutures have been removed one can begin taking some steps to help minimize the appearance of scarring.    WOUND HEALING  As soon as the skin is incised during surgery, the body is taking steps to prepare for healing. After about 3 days, the body has sent cells to the incision to begin the healing process. These cells, called fibroblasts, make collagen, a protein in the skin that helps provide strength. Once the skin has been sufficiently strengthened, the sutures are removed. Over the next year, the body synthesizes new collagen and breaks down old collagen to help achieve a strong scar that allows the foot/ankle to function appropriately. This is where patients can help the appearance of the scar, as it will change over the next year.    STEPS  1. Do not expose the scar to the sun for 1 year.  2. Any sun exposure may permanently darken the appearance of the scar.  3. Wear shoes/socks or cover your scar with zinc oxide.  4. Massage the scar 2-3 times per day.  -Massage the entire length of the scar with gentle to moderate pressure.  -Pressure can help flatten the scar.  5. Lotion/Vitamin E helps keep the tissue soft.  6. Try over the counter scar products such as Mederma or Scar Zone.  -These are available at any pharmacy without a prescription.  -Patients must use these for extended periods of time (6-12 months) to see  a difference.

## 2023-01-20 NOTE — LETTER
1/20/2023         RE: Guerita Gonazlez  [address]  [place] [address]        Dear Colleague,    Thank you for referring your patient, Guerita Gonzalez, to the [facility] PODIATRY. Please see a copy of my visit note below.    Podiatry / Foot and Ankle Surgery Progress Note    January 20, 2023    Subject: Patient was seen for 4 weeks s/p left great toe amputation due to osteomyelitis.  Patient is here with her .  She has been minimal heel weightbearing in the boot on the left foot.  Denies fever, nausea, vomiting.  No pain but has baseline neuropathy.      Objective:  Vitals: /72   Wt 97.5 kg (215 lb)   LMP  (LMP Unknown)   BMI 35.78 kg/m    BMI= Body mass index is 35.78 kg/m .    A1C: 6.4 (12/22/2022)     General:  Patient is alert and orientated.  NAD.     Vascular:  DP and PT pulses are palpable.  No edema or varicosities noted.  CFT's < 3secs.  Skin temp is normal.     Neuro:  Light and gross touch sensation absent to feet.     Derm: Dressing is clean dry and intact to the left foot.  Sutures are intact.  No redness, dehiscence or signs of acute infection noted.     Musculoskeletal: Left great toe was amputated at the metatarsal phalangeal joint..       Assessment:    Diabetic polyneuropathy associated with type 2 diabetes mellitus (H)  Morbid obesity (H)  Post-operative state  Amputation of left great toe (H)        Medical Decision Making/Plan: At this time, the sutures were removed.  She can get the foot wet.  She can start putting full weight on the foot in the boot.  We will have her wear the boot for the next 2 weeks and then in 3 weeks she can start transitioning to shoes around the house in the boot outside of the house.  At 4 weeks she can just be in shoes at all times.  They are going to make an appointment for the diabetic shoes and inserts.  We will have her follow-up in 6 weeks for reassessment.  All questions were answered to patient  satisfaction and they will call for the questions or concerns.        Patient Risk Factor:  Patient is a medium risk factor for infection.    Belén Lawrence DPM, Podiatry/Foot and Ankle Surgery        Again, thank you for allowing me to participate in the care of your patient.        Sincerely,        Belén Lawrence DPM, Podiatry/Foot and Ankle Surgery

## 2023-03-03 ENCOUNTER — OFFICE VISIT (OUTPATIENT)
Dept: PODIATRY | Facility: CLINIC | Age: 58
End: 2023-03-03
Payer: COMMERCIAL

## 2023-03-03 VITALS — WEIGHT: 215 LBS | SYSTOLIC BLOOD PRESSURE: 126 MMHG | DIASTOLIC BLOOD PRESSURE: 80 MMHG | BODY MASS INDEX: 35.78 KG/M2

## 2023-03-03 DIAGNOSIS — S98.112A AMPUTATION OF LEFT GREAT TOE (H): ICD-10-CM

## 2023-03-03 DIAGNOSIS — Z98.890 POST-OPERATIVE STATE: ICD-10-CM

## 2023-03-03 DIAGNOSIS — E11.42 DIABETIC POLYNEUROPATHY ASSOCIATED WITH TYPE 2 DIABETES MELLITUS (H): Primary | ICD-10-CM

## 2023-03-03 PROCEDURE — 99212 OFFICE O/P EST SF 10 MIN: CPT | Performed by: PODIATRIST

## 2023-03-03 NOTE — PROGRESS NOTES
Podiatry / Foot and Ankle Surgery Progress Note    March 3, 2023    Subject: Patient was seen for 2 months status post left great toe amputation due to osteomyelitis.  Patient notes that she is doing well.  Denies fever, nausea, vomiting.  Notes no other concerns with her foot.    Objective:  Vitals: /80   Wt 97.5 kg (215 lb)   LMP  (LMP Unknown)   BMI 35.78 kg/m    BMI= Body mass index is 35.78 kg/m .    A1C: 6.4 (12/22/2022)     General:  Patient is alert and orientated.  NAD.     Vascular:  DP and PT pulses are palpable.  No edema or varicosities noted.  CFT's < 3secs.  Skin temp is normal.     Neuro:  Light and gross touch sensation absent to feet.     Derm: Incision is well-healed.  No other open areas or preulcerative calluses noted.    Musculoskeletal: Left great toe was amputated at the metatarsal phalangeal joint..       Assessment:    Diabetic polyneuropathy associated with type 2 diabetes mellitus (H)  Post-operative state  Amputation of left great toe (H)     Medical Decision Making/Plan: At this time,  she will continue her regular activity in her shoes and inserts.  Discussed checking her feet multiple times a day and if she notices any blisters or the start of calluses to come in and be seen as these can be pressure areas that can lead to ulcers.  We will have her follow-up in 1 year.  All questions were answered to patient satisfaction and they will call for the questions or concerns.        Patient Risk Factor:  Patient is a medium risk factor for infection.    Belén Lawrence DPM, Podiatry/Foot and Ankle Surgery

## 2023-03-03 NOTE — LETTER
3/3/2023         RE: Guerita Gonzalez  [address]  [place] [address]        Dear Colleague,    Thank you for referring your patient, Guerita Gonzalez, to the [facility]. Please see a copy of my visit note below.    Podiatry / Foot and Ankle Surgery Progress Note    March 3, 2023    Subject: Patient was seen for 2 months status post left great toe amputation due to osteomyelitis.  Patient notes that she is doing well.  Denies fever, nausea, vomiting.  Notes no other concerns with her foot.    Objective:  Vitals: /80   Wt 97.5 kg (215 lb)   LMP  (LMP Unknown)   BMI 35.78 kg/m    BMI= Body mass index is 35.78 kg/m .    A1C: 6.4 (12/22/2022)     General:  Patient is alert and orientated.  NAD.     Vascular:  DP and PT pulses are palpable.  No edema or varicosities noted.  CFT's < 3secs.  Skin temp is normal.     Neuro:  Light and gross touch sensation absent to feet.     Derm: Incision is well-healed.  No other open areas or preulcerative calluses noted.    Musculoskeletal: Left great toe was amputated at the metatarsal phalangeal joint..       Assessment:    Diabetic polyneuropathy associated with type 2 diabetes mellitus (H)  Post-operative state  Amputation of left great toe (H)     Medical Decision Making/Plan: At this time,  she will continue her regular activity in her shoes and inserts.  Discussed checking her feet multiple times a day and if she notices any blisters or the start of calluses to come in and be seen as these can be pressure areas that can lead to ulcers.  We will have her follow-up in 1 year.  All questions were answered to patient satisfaction and they will call for the questions or concerns.        Patient Risk Factor:  Patient is a medium risk factor for infection.    Belén Lawrence DPM, Podiatry/Foot and Ankle Surgery            Again, thank you for allowing me to participate in the care of your patient.        Sincerely,        Belén  KAIT Lawrence DPM, Podiatry/Foot and Ankle Surgery

## 2023-04-01 ENCOUNTER — HEALTH MAINTENANCE LETTER (OUTPATIENT)
Age: 58
End: 2023-04-01

## 2023-08-27 ENCOUNTER — HEALTH MAINTENANCE LETTER (OUTPATIENT)
Age: 58
End: 2023-08-27

## 2023-11-05 ENCOUNTER — HEALTH MAINTENANCE LETTER (OUTPATIENT)
Age: 58
End: 2023-11-05

## 2024-01-14 ENCOUNTER — HEALTH MAINTENANCE LETTER (OUTPATIENT)
Age: 59
End: 2024-01-14

## 2024-06-02 ENCOUNTER — HEALTH MAINTENANCE LETTER (OUTPATIENT)
Age: 59
End: 2024-06-02

## 2024-10-20 ENCOUNTER — HEALTH MAINTENANCE LETTER (OUTPATIENT)
Age: 59
End: 2024-10-20

## 2024-12-22 ENCOUNTER — HEALTH MAINTENANCE LETTER (OUTPATIENT)
Age: 59
End: 2024-12-22

## 2025-01-26 ENCOUNTER — HEALTH MAINTENANCE LETTER (OUTPATIENT)
Age: 60
End: 2025-01-26

## 2025-04-12 ENCOUNTER — HEALTH MAINTENANCE LETTER (OUTPATIENT)
Age: 60
End: 2025-04-12

## 2025-05-03 ENCOUNTER — HEALTH MAINTENANCE LETTER (OUTPATIENT)
Age: 60
End: 2025-05-03

## 2025-08-16 ENCOUNTER — HEALTH MAINTENANCE LETTER (OUTPATIENT)
Age: 60
End: 2025-08-16

## (undated) DEVICE — ESU PENCIL W/HOLSTER E2350H

## (undated) DEVICE — NDL 25GA 1.5" 305127

## (undated) DEVICE — SYR 10ML LL W/O NDL 302995

## (undated) DEVICE — SOL WATER IRRIG 1000ML BOTTLE 2F7114

## (undated) DEVICE — DRSG KERLIX FLUFFS X5

## (undated) DEVICE — LINEN TOWEL PACK X5 5464

## (undated) DEVICE — CAST PADDING 4" STERILE 9044S

## (undated) DEVICE — DRSG KERLIX 4 1/2"X4YDS ROLL 6715

## (undated) DEVICE — BLADE SAW SAGITTAL 25.5X9.5X.4MM FINE LINVATEC 5023-138

## (undated) DEVICE — GLOVE BIOGEL PI MICRO INDICATOR UNDERGLOVE SZ 6.5 48965

## (undated) DEVICE — GLOVE BIOGEL PI SZ 6.5 40865

## (undated) DEVICE — DRSG ADAPTIC 3X8" 6113

## (undated) DEVICE — PACK EXTREMITY SOP15EXFSD

## (undated) DEVICE — DRAPE STERI TOWEL LG 1010

## (undated) DEVICE — ESU GROUND PAD UNIVERSAL W/O CORD

## (undated) DEVICE — BLADE KNIFE SURG 15 371115

## (undated) DEVICE — SU PROLENE 3-0 PS-2 18" 8687H

## (undated) DEVICE — ESU GROUND PAD E7506

## (undated) DEVICE — PREP SKIN SCRUB TRAY 4461A

## (undated) DEVICE — CAST PADDING 6" STERILE 9046S

## (undated) RX ORDER — DEXAMETHASONE SODIUM PHOSPHATE 4 MG/ML
INJECTION, SOLUTION INTRA-ARTICULAR; INTRALESIONAL; INTRAMUSCULAR; INTRAVENOUS; SOFT TISSUE
Status: DISPENSED
Start: 2022-12-24

## (undated) RX ORDER — FENTANYL CITRATE 50 UG/ML
INJECTION, SOLUTION INTRAMUSCULAR; INTRAVENOUS
Status: DISPENSED
Start: 2022-12-24

## (undated) RX ORDER — ONDANSETRON 2 MG/ML
INJECTION INTRAMUSCULAR; INTRAVENOUS
Status: DISPENSED
Start: 2022-12-24

## (undated) RX ORDER — PROPOFOL 10 MG/ML
INJECTION, EMULSION INTRAVENOUS
Status: DISPENSED
Start: 2022-12-24

## (undated) RX ORDER — BUPIVACAINE HYDROCHLORIDE 5 MG/ML
INJECTION, SOLUTION EPIDURAL; INTRACAUDAL
Status: DISPENSED
Start: 2022-12-24